# Patient Record
Sex: MALE | Race: WHITE | NOT HISPANIC OR LATINO | Employment: FULL TIME | URBAN - METROPOLITAN AREA
[De-identification: names, ages, dates, MRNs, and addresses within clinical notes are randomized per-mention and may not be internally consistent; named-entity substitution may affect disease eponyms.]

---

## 2023-04-06 PROBLEM — M48.061 SPINAL STENOSIS AT L4-L5 LEVEL: Status: ACTIVE | Noted: 2023-04-06

## 2023-04-06 PROBLEM — H33.313 RETINAL TEAR OF BOTH EYES: Status: ACTIVE | Noted: 2023-04-06

## 2023-04-06 PROBLEM — G89.29 PAIN, FOOT, CHRONIC: Status: ACTIVE | Noted: 2023-04-06

## 2023-04-06 PROBLEM — G47.00 INSOMNIA: Status: ACTIVE | Noted: 2023-04-06

## 2023-04-06 PROBLEM — E55.9 VITAMIN D DEFICIENCY: Status: ACTIVE | Noted: 2023-04-06

## 2023-04-06 PROBLEM — M54.50 LUMBAR PAIN WITH RADIATION DOWN RIGHT LEG: Status: ACTIVE | Noted: 2023-04-06

## 2023-04-06 PROBLEM — E78.5 HLD (HYPERLIPIDEMIA): Status: ACTIVE | Noted: 2023-04-06

## 2023-04-06 PROBLEM — M79.673 PAIN, FOOT, CHRONIC: Status: ACTIVE | Noted: 2023-04-06

## 2023-04-06 PROBLEM — I83.812 VARICOSE VEINS OF LEFT LOWER EXTREMITY WITH PAIN: Status: ACTIVE | Noted: 2023-04-06

## 2023-04-06 PROBLEM — M47.816 DEGENERATIVE JOINT DISEASE (DJD) OF LUMBAR SPINE: Status: ACTIVE | Noted: 2023-04-06

## 2023-04-06 PROBLEM — M51.27 HERNIATED NUCLEUS PULPOSUS OF LUMBOSACRAL REGION: Status: ACTIVE | Noted: 2023-04-06

## 2023-04-06 PROBLEM — G57.90 NEUROPATHY OF FOOT: Status: ACTIVE | Noted: 2023-04-06

## 2023-04-06 PROBLEM — F41.9 ANXIETY: Status: ACTIVE | Noted: 2023-04-06

## 2023-04-06 PROBLEM — F17.200 NICOTINE ADDICTION: Status: ACTIVE | Noted: 2023-04-06

## 2023-04-06 PROBLEM — M79.604 LUMBAR PAIN WITH RADIATION DOWN RIGHT LEG: Status: ACTIVE | Noted: 2023-04-06

## 2023-04-06 PROBLEM — R53.83 FATIGUE: Status: ACTIVE | Noted: 2023-04-06

## 2023-04-06 PROBLEM — F32.A DEPRESSION: Status: ACTIVE | Noted: 2023-04-06

## 2023-04-17 ENCOUNTER — APPOINTMENT (OUTPATIENT)
Dept: PRIMARY CARE | Facility: CLINIC | Age: 48
End: 2023-04-17

## 2023-06-12 RX ORDER — FLUOXETINE HYDROCHLORIDE 40 MG/1
CAPSULE ORAL
COMMUNITY
Start: 2022-01-25 | End: 2023-06-28 | Stop reason: SDUPTHER

## 2023-06-12 RX ORDER — ROSUVASTATIN CALCIUM 10 MG/1
1 TABLET, COATED ORAL DAILY
COMMUNITY
Start: 2022-11-09 | End: 2023-06-28 | Stop reason: SDUPTHER

## 2023-06-12 RX ORDER — TIZANIDINE HYDROCHLORIDE 4 MG/1
4 CAPSULE, GELATIN COATED ORAL 3 TIMES DAILY
COMMUNITY
End: 2023-06-28 | Stop reason: SDUPTHER

## 2023-06-12 RX ORDER — MULTIVITAMIN
TABLET ORAL
COMMUNITY
Start: 2022-10-24

## 2023-06-12 RX ORDER — DICLOFENAC POTASSIUM 50 MG/1
TABLET, FILM COATED ORAL 3 TIMES DAILY
COMMUNITY
End: 2023-06-28 | Stop reason: SDUPTHER

## 2023-06-12 RX ORDER — ERGOCALCIFEROL 1.25 MG/1
1 CAPSULE ORAL
COMMUNITY
Start: 2022-04-25 | End: 2023-07-31 | Stop reason: SDUPTHER

## 2023-06-12 RX ORDER — QUETIAPINE FUMARATE 25 MG/1
TABLET, FILM COATED ORAL
COMMUNITY
Start: 2022-01-25 | End: 2023-06-28 | Stop reason: SDUPTHER

## 2023-06-22 NOTE — TELEPHONE ENCOUNTER
REFILL REQUEST    Med: Fluoxetine   Med Dose: 40 mg  Med Frequency: one cap daily    Pharmacy: [facility]  Pharmacy Address: [address] [place] [address]    LR: 10/24/2022  LV: 01/24/2023  NV: None, could not find a place on your schedule for him (need to speak to you about this)

## 2023-06-25 RX ORDER — FLUOXETINE HYDROCHLORIDE 40 MG/1
40 CAPSULE ORAL
OUTPATIENT
Start: 2023-06-25

## 2023-06-28 ENCOUNTER — OFFICE VISIT (OUTPATIENT)
Dept: PRIMARY CARE | Facility: CLINIC | Age: 48
End: 2023-06-28
Payer: COMMERCIAL

## 2023-06-28 VITALS
HEIGHT: 74 IN | SYSTOLIC BLOOD PRESSURE: 118 MMHG | HEART RATE: 80 BPM | DIASTOLIC BLOOD PRESSURE: 68 MMHG | WEIGHT: 212 LBS | OXYGEN SATURATION: 98 % | BODY MASS INDEX: 27.21 KG/M2

## 2023-06-28 DIAGNOSIS — R53.83 OTHER FATIGUE: ICD-10-CM

## 2023-06-28 DIAGNOSIS — D72.829 LEUKOCYTOSIS, UNSPECIFIED TYPE: ICD-10-CM

## 2023-06-28 DIAGNOSIS — F41.9 ANXIETY: ICD-10-CM

## 2023-06-28 DIAGNOSIS — G47.00 INSOMNIA, UNSPECIFIED TYPE: ICD-10-CM

## 2023-06-28 DIAGNOSIS — E78.5 HYPERLIPIDEMIA, UNSPECIFIED HYPERLIPIDEMIA TYPE: ICD-10-CM

## 2023-06-28 DIAGNOSIS — F17.210 CIGARETTE NICOTINE DEPENDENCE WITHOUT COMPLICATION: Primary | ICD-10-CM

## 2023-06-28 DIAGNOSIS — R73.09 ELEVATED GLUCOSE: ICD-10-CM

## 2023-06-28 DIAGNOSIS — M79.604 LUMBAR PAIN WITH RADIATION DOWN RIGHT LEG: ICD-10-CM

## 2023-06-28 DIAGNOSIS — H83.3X3 NOISE-INDUCED HEARING LOSS OF BOTH EARS: ICD-10-CM

## 2023-06-28 DIAGNOSIS — F32.0 CURRENT MILD EPISODE OF MAJOR DEPRESSIVE DISORDER WITHOUT PRIOR EPISODE (CMS-HCC): ICD-10-CM

## 2023-06-28 DIAGNOSIS — M47.896 OTHER OSTEOARTHRITIS OF SPINE, LUMBAR REGION: ICD-10-CM

## 2023-06-28 DIAGNOSIS — M54.50 LUMBAR PAIN WITH RADIATION DOWN RIGHT LEG: ICD-10-CM

## 2023-06-28 DIAGNOSIS — E66.3 OVERWEIGHT WITH BODY MASS INDEX (BMI) OF 27 TO 27.9 IN ADULT: ICD-10-CM

## 2023-06-28 PROCEDURE — 99406 BEHAV CHNG SMOKING 3-10 MIN: CPT | Performed by: NURSE PRACTITIONER

## 2023-06-28 PROCEDURE — 99214 OFFICE O/P EST MOD 30 MIN: CPT | Performed by: NURSE PRACTITIONER

## 2023-06-28 PROCEDURE — 4004F PT TOBACCO SCREEN RCVD TLK: CPT | Performed by: NURSE PRACTITIONER

## 2023-06-28 PROCEDURE — 3008F BODY MASS INDEX DOCD: CPT | Performed by: NURSE PRACTITIONER

## 2023-06-28 RX ORDER — ROSUVASTATIN CALCIUM 10 MG/1
10 TABLET, COATED ORAL DAILY
Qty: 90 TABLET | Refills: 1 | Status: SHIPPED | OUTPATIENT
Start: 2023-06-28 | End: 2023-07-31 | Stop reason: ALTCHOICE

## 2023-06-28 RX ORDER — TIZANIDINE HYDROCHLORIDE 4 MG/1
4 CAPSULE, GELATIN COATED ORAL 2 TIMES DAILY PRN
Qty: 60 CAPSULE | Refills: 1 | Status: SHIPPED | OUTPATIENT
Start: 2023-06-28 | End: 2023-07-31 | Stop reason: SDUPTHER

## 2023-06-28 RX ORDER — FLUOXETINE HYDROCHLORIDE 40 MG/1
40 CAPSULE ORAL
Qty: 90 CAPSULE | Refills: 1 | Status: SHIPPED | OUTPATIENT
Start: 2023-06-28 | End: 2023-07-31 | Stop reason: SDUPTHER

## 2023-06-28 RX ORDER — DICLOFENAC POTASSIUM 50 MG/1
50 TABLET, FILM COATED ORAL 2 TIMES DAILY PRN
Qty: 60 TABLET | Refills: 5 | Status: SHIPPED | OUTPATIENT
Start: 2023-06-28 | End: 2023-07-31 | Stop reason: SDUPTHER

## 2023-06-28 RX ORDER — QUETIAPINE FUMARATE 25 MG/1
25 TABLET, FILM COATED ORAL NIGHTLY
Qty: 90 TABLET | Refills: 1 | Status: SHIPPED | OUTPATIENT
Start: 2023-06-28 | End: 2023-07-31 | Stop reason: SDUPTHER

## 2023-06-28 ASSESSMENT — PATIENT HEALTH QUESTIONNAIRE - PHQ9
1. LITTLE INTEREST OR PLEASURE IN DOING THINGS: NOT AT ALL
2. FEELING DOWN, DEPRESSED OR HOPELESS: NOT AT ALL
SUM OF ALL RESPONSES TO PHQ9 QUESTIONS 1 & 2: 0

## 2023-06-28 NOTE — PROGRESS NOTES
General Medical Management Note    47 y.o. male presents for Medical Management    HPI    Denies recent hospitalizations, surgeries or ER visits    Diet:   healthy, keto   Caffeine per day: 1 pot  Water per day:  at least 64 ounces  Exercise: 25,000 steps per day  Alcohol: Sober 1/3/22  Tobacco: 15 cigarettes per day    Anxiety and depression:   Managed well with Prozac 40 mg.   Denies thoughts of suicide or homicide     Insomnia:  Managed well with Seroquel 25 mg.   States that he sleeps well and does not wake up groggy     Vitamin D deficiency:   Taking prescribed Vit D  Taking MVI    Hyperlipidemia:   Managed with medication: Crestor   Denies myalgias or arthralgias.    LBP:  Diclofenac every day  Tizanidine as needed      Past Medical History:   Diagnosis Date    Anesthesia of skin 03/05/2020    Numbness and tingling    Personal history of other diseases of the nervous system and sense organs 04/30/2020    History of eye problem    Personal history of suicidal behavior 01/25/2022    History of suicide attempt      Past Surgical History:   Procedure Laterality Date    BACK SURGERY  06/08/2021    Back Surgery    IR ABLATION VEIN RFA  11/5/2019    IR ABLATION VEIN RFA 11/5/2019 [facility]    OTHER SURGICAL HISTORY  12/31/2019    Radiofrequency ablation    OTHER SURGICAL HISTORY  04/30/2020    Ablation    OTHER SURGICAL HISTORY  03/05/2020    Vasectomy     Family History   Problem Relation Name Age of Onset    Hypertension Mother      Hypertension Father      Other (malignant neoplasm of breast) Maternal Grandmother      Colon cancer Maternal Grandfather      Other (cardiac disorder) Paternal Grandmother      Other (cardiac disorder) Paternal Grandfather        Social History     Socioeconomic History    Marital status:      Spouse name: Not on file    Number of children: Not on file    Years of education: Not on file    Highest education level: Not on file   Occupational History    Not on file   Tobacco  "Use    Smoking status: Every Day     Packs/day: 1.00     Years: 15.00     Total pack years: 15.00     Types: Cigarettes    Smokeless tobacco: Never   Substance and Sexual Activity    Alcohol use: Not on file    Drug use: Never    Sexual activity: Not on file   Other Topics Concern    Not on file   Social History Narrative    Not on file     Social Determinants of Health     Financial Resource Strain: Not on file   Food Insecurity: Not on file   Transportation Needs: Not on file   Physical Activity: Not on file   Stress: Not on file   Social Connections: Not on file   Intimate Partner Violence: Not on file   Housing Stability: Not on file       Current Outpatient Medications on File Prior to Visit   Medication Sig Dispense Refill    diclofenac (Cataflam) 50 mg tablet Take by mouth 3 times a day.      ergocalciferol (Vitamin D-2) 1.25 MG (35664 UT) capsule Take 1 capsule (1,250 mcg) by mouth 1 (one) time per week.      FLUoxetine (PROzac) 40 mg capsule Take by mouth once daily.      multivitamin (Daily Multi-Vitamin) tablet Take by mouth.      QUEtiapine (SEROquel) 25 mg tablet Take by mouth.      rosuvastatin (Crestor) 10 mg tablet Take 1 tablet (10 mg) by mouth once daily.      tiZANidine (Zanaflex) 4 mg capsule Take 1 capsule (4 mg) by mouth 3 times a day.       No current facility-administered medications on file prior to visit.       Not on File      ROS: Denies chest pain, SOB, Headache, GI problems     Visit Vitals  Smoking Status Every Day    Visit Vitals  /68   Pulse 80   Ht 1.88 m (6' 2\")   Wt 96.2 kg (212 lb)   SpO2 98%   BMI 27.22 kg/m²   Smoking Status Every Day   BSA 2.24 m²         PHYSICAL EXAM:  Alert and oriented x3.  Eyes: EOM grossly intact  Neck supple without lymph adenopathy or carotid bruit.  No masses or thyromegaly  Heart regular rate and rhythm without murmur.  Lungs clear to auscultation.  Legs without edema.  Gait is non-antalgic  Speech clear.  Hearing adequate.  "         DIAGNOSIS/PLAN:    1. Hyperlipidemia, unspecified hyperlipidemia type  - Comprehensive metabolic panel; Future  - Lipid panel; Future  - rosuvastatin (Crestor) 10 mg tablet; Take 1 tablet (10 mg) by mouth once daily.  Dispense: 90 tablet; Refill: 1    2. Insomnia, unspecified type  Stable.  Continue current medications.  - QUEtiapine (SEROquel) 25 mg tablet; Take 1 tablet (25 mg) by mouth once daily at bedtime.  Dispense: 90 tablet; Refill: 1    3. Anxiety  Stable.   Continue current medication/treatment plan.     Aware at any time if thoughts of suicide or homicide are present contact medical services immediately.  - FLUoxetine (PROzac) 40 mg capsule; Take 1 capsule (40 mg) by mouth once daily.  Dispense: 90 capsule; Refill: 1    4. Current mild episode of major depressive disorder without prior episode (CMS/HCC)  Stable.   Continue current medication/treatment plan.     Aware at any time if thoughts of suicide or homicide are present contact medical services immediately.    6. Cigarette nicotine dependence without complication  I spent more than three minutes but less than 10 minutes counseling patient about need for smoking/tobacco cessation and how I can support efforts when patient is ready to quit. Discussed various therapies.    7. Noise-induced hearing loss of both ears  - Referral to Audiology; Future    8. Lumbar pain with radiation down right leg  Continue to remain active and perform stretching exercises  - diclofenac (Cataflam) 50 mg tablet; Take 1 tablet (50 mg) by mouth 2 times a day as needed (lower back pain).  Dispense: 60 tablet; Refill: 5  - tiZANidine (Zanaflex) 4 mg capsule; Take 1 capsule (4 mg) by mouth 2 times a day as needed for muscle spasms.  Dispense: 60 capsule; Refill: 1    9. Overweight with body mass index of 27 to 27.9 in adult:   Patient encouraged to follow diet of lower carbohydrates and increase vegetable and fruit intake.   Patient also encouraged to increase water  intake to 80 ounces/day.   Start or continue exercise for at least 30 minutes a day on most days of the week.     offers various ways to learn about healthy eating and healthy weight loss.     Medications refills will be completed as discussed.     Any labs or testing that is ordered will be reviewed and the results will be in your chart .   You can review these via  Octopart.     Follow up six months for complete physical exam.    Prescriptions will not be filled unless you are compliant with your follow-up appointments or have a follow-up appointment scheduled as per the instruction of your provider. Refills for medications should be requested at the time of your office visit.     Please allow one week for refill requests to be completed.     Contact office with any questions or concerns.   Preferred communication is via  Octopart  Please contact [email or web address] if having issues with  Octopart    Monique MURPHY-[facility]  [address], [address]  [place] [address]  Phone: [phone]    **Charting was completed using voice recognition technology and may include unintended errors**

## 2023-06-29 ENCOUNTER — LAB (OUTPATIENT)
Dept: LAB | Facility: LAB | Age: 48
End: 2023-06-29
Payer: COMMERCIAL

## 2023-06-29 DIAGNOSIS — R53.83 OTHER FATIGUE: ICD-10-CM

## 2023-06-29 DIAGNOSIS — Z00.00 HEALTHCARE MAINTENANCE: ICD-10-CM

## 2023-06-29 DIAGNOSIS — E78.5 HYPERLIPIDEMIA, UNSPECIFIED HYPERLIPIDEMIA TYPE: ICD-10-CM

## 2023-06-29 DIAGNOSIS — E55.9 VITAMIN D DEFICIENCY: ICD-10-CM

## 2023-06-29 LAB
ALANINE AMINOTRANSFERASE (SGPT) (U/L) IN SER/PLAS: 28 U/L (ref 10–52)
ALBUMIN (G/DL) IN SER/PLAS: 4.5 G/DL (ref 3.4–5)
ALKALINE PHOSPHATASE (U/L) IN SER/PLAS: 81 U/L (ref 33–120)
ANION GAP IN SER/PLAS: 13 MMOL/L (ref 10–20)
APPEARANCE, URINE: CLEAR
ASPARTATE AMINOTRANSFERASE (SGOT) (U/L) IN SER/PLAS: 21 U/L (ref 9–39)
BILIRUBIN TOTAL (MG/DL) IN SER/PLAS: 0.6 MG/DL (ref 0–1.2)
BILIRUBIN, URINE: NEGATIVE
BLOOD, URINE: NEGATIVE
CALCIDIOL (25 OH VITAMIN D3) (NG/ML) IN SER/PLAS: 44 NG/ML
CALCIUM (MG/DL) IN SER/PLAS: 9.5 MG/DL (ref 8.6–10.3)
CARBON DIOXIDE, TOTAL (MMOL/L) IN SER/PLAS: 25 MMOL/L (ref 21–32)
CHLORIDE (MMOL/L) IN SER/PLAS: 105 MMOL/L (ref 98–107)
CHOLESTEROL (MG/DL) IN SER/PLAS: 245 MG/DL (ref 0–199)
CHOLESTEROL IN HDL (MG/DL) IN SER/PLAS: 44.7 MG/DL
CHOLESTEROL/HDL RATIO: 5.5
COLOR, URINE: YELLOW
CREATININE (MG/DL) IN SER/PLAS: 0.81 MG/DL (ref 0.5–1.3)
ERYTHROCYTE DISTRIBUTION WIDTH (RATIO) BY AUTOMATED COUNT: 13.4 % (ref 11.5–14.5)
ERYTHROCYTE MEAN CORPUSCULAR HEMOGLOBIN CONCENTRATION (G/DL) BY AUTOMATED: 32.4 G/DL (ref 32–36)
ERYTHROCYTE MEAN CORPUSCULAR VOLUME (FL) BY AUTOMATED COUNT: 93 FL (ref 80–100)
ERYTHROCYTES (10*6/UL) IN BLOOD BY AUTOMATED COUNT: 5.16 X10E12/L (ref 4.5–5.9)
GFR MALE: >90 ML/MIN/1.73M2
GLUCOSE (MG/DL) IN SER/PLAS: 142 MG/DL (ref 74–99)
GLUCOSE, URINE: NEGATIVE MG/DL
HEMATOCRIT (%) IN BLOOD BY AUTOMATED COUNT: 48.2 % (ref 41–52)
HEMOGLOBIN (G/DL) IN BLOOD: 15.6 G/DL (ref 13.5–17.5)
KETONES, URINE: NEGATIVE MG/DL
LDL: 171 MG/DL (ref 0–99)
LEUKOCYTE ESTERASE, URINE: NEGATIVE
LEUKOCYTES (10*3/UL) IN BLOOD BY AUTOMATED COUNT: 14.1 X10E9/L (ref 4.4–11.3)
NITRITE, URINE: NEGATIVE
PH, URINE: 6 (ref 5–8)
PLATELETS (10*3/UL) IN BLOOD AUTOMATED COUNT: 289 X10E9/L (ref 150–450)
POTASSIUM (MMOL/L) IN SER/PLAS: 4.7 MMOL/L (ref 3.5–5.3)
PROSTATE SPECIFIC ANTIGEN,SCREEN: 2.64 NG/ML (ref 0–4)
PROTEIN TOTAL: 7.6 G/DL (ref 6.4–8.2)
PROTEIN, URINE: NEGATIVE MG/DL
SODIUM (MMOL/L) IN SER/PLAS: 138 MMOL/L (ref 136–145)
SPECIFIC GRAVITY, URINE: 1.02 (ref 1–1.03)
TESTOSTERONE (NG/DL) IN SER/PLAS: 554 NG/DL (ref 240–1000)
THYROTROPIN (MIU/L) IN SER/PLAS BY DETECTION LIMIT <= 0.05 MIU/L: 2.1 MIU/L (ref 0.44–3.98)
TRIGLYCERIDE (MG/DL) IN SER/PLAS: 146 MG/DL (ref 0–149)
UREA NITROGEN (MG/DL) IN SER/PLAS: 20 MG/DL (ref 6–23)
UROBILINOGEN, URINE: <2 MG/DL (ref 0–1.9)
VLDL: 29 MG/DL (ref 0–40)

## 2023-06-29 PROCEDURE — 36415 COLL VENOUS BLD VENIPUNCTURE: CPT

## 2023-06-29 PROCEDURE — 85027 COMPLETE CBC AUTOMATED: CPT

## 2023-06-29 PROCEDURE — 82306 VITAMIN D 25 HYDROXY: CPT

## 2023-06-29 PROCEDURE — 80061 LIPID PANEL: CPT

## 2023-06-29 PROCEDURE — 84153 ASSAY OF PSA TOTAL: CPT

## 2023-06-29 PROCEDURE — 81003 URINALYSIS AUTO W/O SCOPE: CPT

## 2023-06-29 PROCEDURE — 84443 ASSAY THYROID STIM HORMONE: CPT

## 2023-06-29 PROCEDURE — 84403 ASSAY OF TOTAL TESTOSTERONE: CPT

## 2023-06-29 PROCEDURE — 80053 COMPREHEN METABOLIC PANEL: CPT

## 2023-07-06 DIAGNOSIS — E78.5 HYPERLIPIDEMIA, UNSPECIFIED HYPERLIPIDEMIA TYPE: Primary | ICD-10-CM

## 2023-07-06 PROBLEM — E66.3 OVERWEIGHT WITH BODY MASS INDEX (BMI) OF 27 TO 27.9 IN ADULT: Status: ACTIVE | Noted: 2023-07-06

## 2023-07-06 RX ORDER — ROSUVASTATIN CALCIUM 20 MG/1
20 TABLET, COATED ORAL DAILY
Qty: 90 TABLET | Refills: 1 | Status: SHIPPED | OUTPATIENT
Start: 2023-07-06 | End: 2023-07-31 | Stop reason: SDUPTHER

## 2023-07-31 ENCOUNTER — TELEPHONE (OUTPATIENT)
Dept: PRIMARY CARE | Facility: CLINIC | Age: 48
End: 2023-07-31

## 2023-07-31 DIAGNOSIS — M79.604 LUMBAR PAIN WITH RADIATION DOWN RIGHT LEG: ICD-10-CM

## 2023-07-31 DIAGNOSIS — E78.5 HYPERLIPIDEMIA, UNSPECIFIED HYPERLIPIDEMIA TYPE: ICD-10-CM

## 2023-07-31 DIAGNOSIS — E55.9 VITAMIN D DEFICIENCY: Primary | ICD-10-CM

## 2023-07-31 DIAGNOSIS — G47.00 INSOMNIA, UNSPECIFIED TYPE: ICD-10-CM

## 2023-07-31 DIAGNOSIS — F41.9 ANXIETY: ICD-10-CM

## 2023-07-31 DIAGNOSIS — M54.50 LUMBAR PAIN WITH RADIATION DOWN RIGHT LEG: ICD-10-CM

## 2023-07-31 RX ORDER — TIZANIDINE HYDROCHLORIDE 4 MG/1
4 CAPSULE, GELATIN COATED ORAL 2 TIMES DAILY PRN
Qty: 60 CAPSULE | Refills: 1 | Status: SHIPPED | OUTPATIENT
Start: 2023-07-31 | End: 2024-01-08 | Stop reason: SDUPTHER

## 2023-07-31 RX ORDER — ERGOCALCIFEROL 1.25 MG/1
1 CAPSULE ORAL
Qty: 13 CAPSULE | Refills: 3 | Status: SHIPPED | OUTPATIENT
Start: 2023-07-31 | End: 2024-01-08 | Stop reason: SDUPTHER

## 2023-07-31 RX ORDER — DICLOFENAC POTASSIUM 50 MG/1
50 TABLET, FILM COATED ORAL 2 TIMES DAILY PRN
Qty: 60 TABLET | Refills: 5 | Status: SHIPPED | OUTPATIENT
Start: 2023-07-31 | End: 2024-01-08 | Stop reason: SDUPTHER

## 2023-07-31 RX ORDER — QUETIAPINE FUMARATE 25 MG/1
25 TABLET, FILM COATED ORAL NIGHTLY
Qty: 90 TABLET | Refills: 1 | Status: SHIPPED | OUTPATIENT
Start: 2023-07-31 | End: 2024-01-08 | Stop reason: SDUPTHER

## 2023-07-31 RX ORDER — ROSUVASTATIN CALCIUM 20 MG/1
20 TABLET, COATED ORAL DAILY
Qty: 90 TABLET | Refills: 1 | Status: SHIPPED | OUTPATIENT
Start: 2023-07-31 | End: 2024-01-08 | Stop reason: SDUPTHER

## 2023-07-31 RX ORDER — FLUOXETINE HYDROCHLORIDE 40 MG/1
40 CAPSULE ORAL
Qty: 90 CAPSULE | Refills: 1 | Status: SHIPPED | OUTPATIENT
Start: 2023-07-31 | End: 2024-01-08 | Stop reason: SDUPTHER

## 2023-07-31 NOTE — TELEPHONE ENCOUNTER
Pt's insurance will not cover his medication at [facility] anymore. He needs all his medications sent to [facility] in [place].     Medications:     Rosuvastatin 20 MG; Take 1 tablet once daily.     LR: 7-6-23  90 tablets with 1 refill     Diclofenac 50 MG; Take 1 tablet by mouth 2 times a day as needed (lower back pain).    LR: 6-28-23  60 tablets with 5 refills     Fluoxetine 40 MG; Take 1 capsule once daily.     LR: 6-28-23  90 capsules with 1 refill    Quetiapine 25 MG; Take 1 tablet once daily at bedtime.     LR: 6-28-23  90 tablets with 1 refill    Tizanidine 4 MG; Take 1 capsule 2 times a day as needed for muscle spasms.     LR: 6-28-23  60 capsules with 1 refills     Pharmacy: [facility]   Phone Number: [phone]

## 2023-08-31 ENCOUNTER — LAB (OUTPATIENT)
Dept: LAB | Facility: LAB | Age: 48
End: 2023-08-31
Payer: COMMERCIAL

## 2023-08-31 DIAGNOSIS — R73.09 ELEVATED GLUCOSE: ICD-10-CM

## 2023-08-31 DIAGNOSIS — D72.829 LEUKOCYTOSIS, UNSPECIFIED TYPE: ICD-10-CM

## 2023-08-31 DIAGNOSIS — E78.5 HYPERLIPIDEMIA, UNSPECIFIED HYPERLIPIDEMIA TYPE: ICD-10-CM

## 2023-08-31 LAB
CHOLESTEROL (MG/DL) IN SER/PLAS: 150 MG/DL (ref 0–199)
CHOLESTEROL IN HDL (MG/DL) IN SER/PLAS: 43.6 MG/DL
CHOLESTEROL/HDL RATIO: 3.4
ERYTHROCYTE DISTRIBUTION WIDTH (RATIO) BY AUTOMATED COUNT: 13.5 % (ref 11.5–14.5)
ERYTHROCYTE MEAN CORPUSCULAR HEMOGLOBIN CONCENTRATION (G/DL) BY AUTOMATED: 32.4 G/DL (ref 32–36)
ERYTHROCYTE MEAN CORPUSCULAR VOLUME (FL) BY AUTOMATED COUNT: 94 FL (ref 80–100)
ERYTHROCYTES (10*6/UL) IN BLOOD BY AUTOMATED COUNT: 4.8 X10E12/L (ref 4.5–5.9)
ESTIMATED AVERAGE GLUCOSE FOR HBA1C: 117 MG/DL
HEMATOCRIT (%) IN BLOOD BY AUTOMATED COUNT: 45 % (ref 41–52)
HEMOGLOBIN (G/DL) IN BLOOD: 14.6 G/DL (ref 13.5–17.5)
HEMOGLOBIN A1C/HEMOGLOBIN TOTAL IN BLOOD: 5.7 %
LDL: 84 MG/DL (ref 0–99)
LEUKOCYTES (10*3/UL) IN BLOOD BY AUTOMATED COUNT: 12.6 X10E9/L (ref 4.4–11.3)
PLATELETS (10*3/UL) IN BLOOD AUTOMATED COUNT: 247 X10E9/L (ref 150–450)
TRIGLYCERIDE (MG/DL) IN SER/PLAS: 111 MG/DL (ref 0–149)
VLDL: 22 MG/DL (ref 0–40)

## 2023-08-31 PROCEDURE — 83036 HEMOGLOBIN GLYCOSYLATED A1C: CPT

## 2023-08-31 PROCEDURE — 85027 COMPLETE CBC AUTOMATED: CPT

## 2023-08-31 PROCEDURE — 36415 COLL VENOUS BLD VENIPUNCTURE: CPT

## 2023-08-31 PROCEDURE — 80061 LIPID PANEL: CPT

## 2023-12-21 PROBLEM — F33.41 RECURRENT MAJOR DEPRESSION IN PARTIAL REMISSION (CMS-HCC): Status: ACTIVE | Noted: 2020-03-24

## 2023-12-21 PROBLEM — N52.9 ERECTILE DYSFUNCTION: Status: ACTIVE | Noted: 2023-12-21

## 2023-12-21 PROBLEM — R93.7 ABNORMAL MAGNETIC RESONANCE IMAGING OF LUMBAR SPINE: Status: ACTIVE | Noted: 2023-12-21

## 2023-12-21 PROBLEM — T22.00XA: Status: ACTIVE | Noted: 2023-12-21

## 2023-12-21 PROBLEM — R20.2 NUMBNESS AND TINGLING SENSATION OF SKIN: Status: ACTIVE | Noted: 2023-12-21

## 2023-12-21 PROBLEM — Z91.51 HISTORY OF SUICIDE ATTEMPT: Status: ACTIVE | Noted: 2023-12-21

## 2023-12-21 PROBLEM — L84 CALLUS OF FOOT: Status: ACTIVE | Noted: 2023-12-21

## 2023-12-21 PROBLEM — S62.309A FRACTURE OF METACARPAL BONE: Status: ACTIVE | Noted: 2023-12-21

## 2023-12-21 PROBLEM — R73.09 INCREASED GLUCOSE LEVEL: Status: ACTIVE | Noted: 2023-08-31

## 2023-12-21 PROBLEM — H83.3X3 NOISE-INDUCED HEARING LOSS OF BOTH EARS: Status: ACTIVE | Noted: 2023-12-21

## 2023-12-21 PROBLEM — Z86.69 HISTORY OF DISORDER OF EYE REGION: Status: ACTIVE | Noted: 2023-12-21

## 2023-12-21 PROBLEM — I83.90 VARICOSE VEINS OF LOWER EXTREMITY: Status: ACTIVE | Noted: 2023-04-06

## 2023-12-21 PROBLEM — R39.198 SLOWING OF URINARY STREAM: Status: ACTIVE | Noted: 2023-12-21

## 2023-12-21 PROBLEM — M25.519 ARTHRALGIA OF SHOULDER: Status: ACTIVE | Noted: 2023-12-21

## 2023-12-21 PROBLEM — R20.0 NUMBNESS AND TINGLING SENSATION OF SKIN: Status: ACTIVE | Noted: 2023-12-21

## 2023-12-21 PROBLEM — D72.829 LEUKOCYTOSIS: Status: ACTIVE | Noted: 2023-08-31

## 2023-12-21 PROBLEM — R03.0 FINDING OF ABOVE NORMAL BLOOD PRESSURE: Status: ACTIVE | Noted: 2023-12-21

## 2023-12-21 PROBLEM — F43.9 STRESS: Status: ACTIVE | Noted: 2023-12-21

## 2023-12-21 PROBLEM — R25.2 SPASM: Status: ACTIVE | Noted: 2023-12-21

## 2023-12-21 PROBLEM — M21.70 INEQUALITY OF LENGTH OF LOWER EXTREMITY: Status: ACTIVE | Noted: 2023-12-21

## 2023-12-21 PROBLEM — S90.30XA CONTUSION OF FOOT: Status: ACTIVE | Noted: 2023-12-21

## 2024-01-04 ENCOUNTER — APPOINTMENT (OUTPATIENT)
Dept: PRIMARY CARE | Facility: CLINIC | Age: 49
End: 2024-01-04
Payer: COMMERCIAL

## 2024-01-08 ENCOUNTER — OFFICE VISIT (OUTPATIENT)
Dept: PRIMARY CARE | Facility: CLINIC | Age: 49
End: 2024-01-08
Payer: COMMERCIAL

## 2024-01-08 ENCOUNTER — LAB (OUTPATIENT)
Dept: LAB | Facility: LAB | Age: 49
End: 2024-01-08
Payer: COMMERCIAL

## 2024-01-08 VITALS
OXYGEN SATURATION: 99 % | DIASTOLIC BLOOD PRESSURE: 70 MMHG | HEART RATE: 81 BPM | HEIGHT: 74 IN | WEIGHT: 223 LBS | SYSTOLIC BLOOD PRESSURE: 128 MMHG | BODY MASS INDEX: 28.62 KG/M2

## 2024-01-08 DIAGNOSIS — F51.01 PRIMARY INSOMNIA: ICD-10-CM

## 2024-01-08 DIAGNOSIS — Z00.00 ENCOUNTER FOR HEALTH MAINTENANCE EXAMINATION IN ADULT: ICD-10-CM

## 2024-01-08 DIAGNOSIS — M79.604 LUMBAR PAIN WITH RADIATION DOWN RIGHT LEG: ICD-10-CM

## 2024-01-08 DIAGNOSIS — M54.50 LUMBAR PAIN WITH RADIATION DOWN RIGHT LEG: ICD-10-CM

## 2024-01-08 DIAGNOSIS — E55.9 VITAMIN D DEFICIENCY: ICD-10-CM

## 2024-01-08 DIAGNOSIS — E78.2 MIXED HYPERLIPIDEMIA: ICD-10-CM

## 2024-01-08 DIAGNOSIS — R53.83 OTHER FATIGUE: ICD-10-CM

## 2024-01-08 DIAGNOSIS — Z00.00 ENCOUNTER FOR HEALTH MAINTENANCE EXAMINATION IN ADULT: Primary | ICD-10-CM

## 2024-01-08 DIAGNOSIS — R73.09 ELEVATED GLUCOSE: ICD-10-CM

## 2024-01-08 DIAGNOSIS — F41.9 ANXIETY: ICD-10-CM

## 2024-01-08 DIAGNOSIS — G47.00 INSOMNIA, UNSPECIFIED TYPE: ICD-10-CM

## 2024-01-08 DIAGNOSIS — F33.41 RECURRENT MAJOR DEPRESSION IN PARTIAL REMISSION (CMS-HCC): ICD-10-CM

## 2024-01-08 DIAGNOSIS — E78.5 HYPERLIPIDEMIA, UNSPECIFIED HYPERLIPIDEMIA TYPE: ICD-10-CM

## 2024-01-08 PROBLEM — T22.00XA: Status: RESOLVED | Noted: 2023-12-21 | Resolved: 2024-01-08

## 2024-01-08 PROBLEM — S90.30XA CONTUSION OF FOOT: Status: RESOLVED | Noted: 2023-12-21 | Resolved: 2024-01-08

## 2024-01-08 LAB
25(OH)D3 SERPL-MCNC: 53 NG/ML (ref 30–100)
ALBUMIN SERPL BCP-MCNC: 4.5 G/DL (ref 3.4–5)
ALP SERPL-CCNC: 78 U/L (ref 33–120)
ALT SERPL W P-5'-P-CCNC: 23 U/L (ref 10–52)
ANION GAP SERPL CALC-SCNC: 12 MMOL/L (ref 10–20)
APPEARANCE UR: CLEAR
AST SERPL W P-5'-P-CCNC: 17 U/L (ref 9–39)
BILIRUB SERPL-MCNC: 0.8 MG/DL (ref 0–1.2)
BILIRUB UR STRIP.AUTO-MCNC: NEGATIVE MG/DL
BUN SERPL-MCNC: 12 MG/DL (ref 6–23)
CALCIUM SERPL-MCNC: 9.3 MG/DL (ref 8.6–10.3)
CHLORIDE SERPL-SCNC: 104 MMOL/L (ref 98–107)
CHOLEST SERPL-MCNC: 157 MG/DL (ref 0–199)
CHOLESTEROL/HDL RATIO: 3.1
CO2 SERPL-SCNC: 27 MMOL/L (ref 21–32)
COLOR UR: YELLOW
CREAT SERPL-MCNC: 0.82 MG/DL (ref 0.5–1.3)
EGFRCR SERPLBLD CKD-EPI 2021: >90 ML/MIN/1.73M*2
ERYTHROCYTE [DISTWIDTH] IN BLOOD BY AUTOMATED COUNT: 12.5 % (ref 11.5–14.5)
EST. AVERAGE GLUCOSE BLD GHB EST-MCNC: 123 MG/DL
GLUCOSE SERPL-MCNC: 118 MG/DL (ref 74–99)
GLUCOSE UR STRIP.AUTO-MCNC: NEGATIVE MG/DL
HBA1C MFR BLD: 5.9 %
HCT VFR BLD AUTO: 45.8 % (ref 41–52)
HDLC SERPL-MCNC: 50.9 MG/DL
HGB BLD-MCNC: 15.2 G/DL (ref 13.5–17.5)
KETONES UR STRIP.AUTO-MCNC: NEGATIVE MG/DL
LDLC SERPL CALC-MCNC: 95 MG/DL
LEUKOCYTE ESTERASE UR QL STRIP.AUTO: NEGATIVE
MCH RBC QN AUTO: 31.1 PG (ref 26–34)
MCHC RBC AUTO-ENTMCNC: 33.2 G/DL (ref 32–36)
MCV RBC AUTO: 94 FL (ref 80–100)
NITRITE UR QL STRIP.AUTO: NEGATIVE
NON HDL CHOLESTEROL: 106 MG/DL (ref 0–149)
NRBC BLD-RTO: 0 /100 WBCS (ref 0–0)
PH UR STRIP.AUTO: 6 [PH]
PLATELET # BLD AUTO: 338 X10*3/UL (ref 150–450)
POTASSIUM SERPL-SCNC: 5.1 MMOL/L (ref 3.5–5.3)
PROT SERPL-MCNC: 7 G/DL (ref 6.4–8.2)
PROT UR STRIP.AUTO-MCNC: NEGATIVE MG/DL
PSA SERPL-MCNC: 1.76 NG/ML
RBC # BLD AUTO: 4.88 X10*6/UL (ref 4.5–5.9)
RBC # UR STRIP.AUTO: NEGATIVE /UL
SODIUM SERPL-SCNC: 138 MMOL/L (ref 136–145)
SP GR UR STRIP.AUTO: 1.01
TESTOST SERPL-MCNC: 415 NG/DL (ref 240–1000)
TRIGL SERPL-MCNC: 55 MG/DL (ref 0–149)
TSH SERPL-ACNC: 1.04 MIU/L (ref 0.44–3.98)
UROBILINOGEN UR STRIP.AUTO-MCNC: <2 MG/DL
VLDL: 11 MG/DL (ref 0–40)
WBC # BLD AUTO: 10.5 X10*3/UL (ref 4.4–11.3)

## 2024-01-08 PROCEDURE — 82306 VITAMIN D 25 HYDROXY: CPT

## 2024-01-08 PROCEDURE — 99214 OFFICE O/P EST MOD 30 MIN: CPT | Performed by: NURSE PRACTITIONER

## 2024-01-08 PROCEDURE — 99396 PREV VISIT EST AGE 40-64: CPT | Performed by: NURSE PRACTITIONER

## 2024-01-08 PROCEDURE — 80061 LIPID PANEL: CPT

## 2024-01-08 PROCEDURE — 81003 URINALYSIS AUTO W/O SCOPE: CPT

## 2024-01-08 PROCEDURE — 85027 COMPLETE CBC AUTOMATED: CPT

## 2024-01-08 PROCEDURE — 3008F BODY MASS INDEX DOCD: CPT | Performed by: NURSE PRACTITIONER

## 2024-01-08 PROCEDURE — 84153 ASSAY OF PSA TOTAL: CPT

## 2024-01-08 PROCEDURE — 84443 ASSAY THYROID STIM HORMONE: CPT

## 2024-01-08 PROCEDURE — 80053 COMPREHEN METABOLIC PANEL: CPT

## 2024-01-08 PROCEDURE — 84403 ASSAY OF TOTAL TESTOSTERONE: CPT

## 2024-01-08 PROCEDURE — 36415 COLL VENOUS BLD VENIPUNCTURE: CPT

## 2024-01-08 PROCEDURE — 83036 HEMOGLOBIN GLYCOSYLATED A1C: CPT

## 2024-01-08 RX ORDER — QUETIAPINE FUMARATE 25 MG/1
25 TABLET, FILM COATED ORAL NIGHTLY
Qty: 90 TABLET | Refills: 1 | Status: SHIPPED | OUTPATIENT
Start: 2024-01-08

## 2024-01-08 RX ORDER — FLUOXETINE HYDROCHLORIDE 40 MG/1
40 CAPSULE ORAL
Qty: 90 CAPSULE | Refills: 1 | Status: SHIPPED | OUTPATIENT
Start: 2024-01-08

## 2024-01-08 RX ORDER — TIZANIDINE HYDROCHLORIDE 4 MG/1
4 CAPSULE, GELATIN COATED ORAL 2 TIMES DAILY PRN
Qty: 30 CAPSULE | Refills: 1 | Status: SHIPPED | OUTPATIENT
Start: 2024-01-08

## 2024-01-08 RX ORDER — ROSUVASTATIN CALCIUM 20 MG/1
20 TABLET, COATED ORAL DAILY
Qty: 90 TABLET | Refills: 1 | Status: SHIPPED | OUTPATIENT
Start: 2024-01-08 | End: 2024-07-06

## 2024-01-08 RX ORDER — DICLOFENAC POTASSIUM 50 MG/1
50 TABLET, FILM COATED ORAL 2 TIMES DAILY PRN
Qty: 60 TABLET | Refills: 5 | Status: SHIPPED | OUTPATIENT
Start: 2024-01-08

## 2024-01-08 RX ORDER — ERGOCALCIFEROL 1.25 MG/1
1 CAPSULE ORAL
Qty: 13 CAPSULE | Refills: 3 | Status: SHIPPED | OUTPATIENT
Start: 2024-01-08

## 2024-01-08 ASSESSMENT — PATIENT HEALTH QUESTIONNAIRE - PHQ9
2. FEELING DOWN, DEPRESSED OR HOPELESS: NOT AT ALL
SUM OF ALL RESPONSES TO PHQ9 QUESTIONS 1 & 2: 0
1. LITTLE INTEREST OR PLEASURE IN DOING THINGS: NOT AT ALL

## 2024-01-08 NOTE — PROGRESS NOTES
Annual Comprehensive Medical Exam:    48 y.o. male presents for annual comprehensive medical evaluation and preventive services screening.      Recent hospitalizations, surgeries or ER visits: denies     Allowed to report any questions or concerns.    Diet: healthy  Caffeine per day: 1/2 pot of coffee, 1 diet coke  Water per day: 64 ounces  Exercise: 25,000 steps per day  Alcohol: Sober 1/3/22  Nicotine: 15 cigarettes per day   Dentist: overdue  Optometrist: 2020 glasses  Colonoscopy: never    Tobacco: 15 cigarettes per day     Anxiety and depression:   Med: Prozac 40 mg.   Denies thoughts of suicide or homicide  Seeing a counselor every 2 weeks      Insomnia:  Med: Seroquel 25 mg.   States that he sleeps well and does not wake up groggy     Vitamin D deficiency:   Taking prescribed Vit D  Taking MVI     Hyperlipidemia:   Med: Crestor   Denies myalgias or arthralgias.     LBP:  Was taking Diclofenac every day--has been out   Tizanidine as needed. States he can go a month or 2 without needing it         Past Medical History:   Diagnosis Date    Anesthesia of skin 03/05/2020    Numbness and tingling    Personal history of other diseases of the nervous system and sense organs 04/30/2020    History of eye problem    Personal history of suicidal behavior 01/25/2022    History of suicide attempt      Past Surgical History:   Procedure Laterality Date    BACK SURGERY  06/08/2021    Back Surgery    IR ABLATION VEIN RFA  11/5/2019    IR ABLATION VEIN RFA 11/5/2019 [facility]    OTHER SURGICAL HISTORY  12/31/2019    Radiofrequency ablation    OTHER SURGICAL HISTORY  04/30/2020    Ablation    OTHER SURGICAL HISTORY  03/05/2020    Vasectomy       Family History   Problem Relation Name Age of Onset    Hypertension Mother      Hypertension Father      Diabetes type II Father      Other (malignant neoplasm of breast) Maternal Grandmother      Colon cancer Maternal Grandfather      Other (cardiac disorder) Paternal Grandmother       Other (cardiac disorder) Paternal Grandfather          Social History     Socioeconomic History    Marital status:      Spouse name: Not on file    Number of children: Not on file    Years of education: Not on file    Highest education level: Not on file   Occupational History    Not on file   Tobacco Use    Smoking status: Every Day     Packs/day: 1.00     Years: 15.00     Additional pack years: 0.00     Total pack years: 15.00     Types: Cigarettes    Smokeless tobacco: Never   Substance and Sexual Activity    Alcohol use: Not Currently    Drug use: Never    Sexual activity: Not on file   Other Topics Concern    Not on file   Social History Narrative    Not on file     Social Determinants of Health     Financial Resource Strain: Not on file   Food Insecurity: Not on file   Transportation Needs: Not on file   Physical Activity: Not on file   Stress: Not on file   Social Connections: Not on file   Intimate Partner Violence: Not on file   Housing Stability: Not on file       Current Outpatient Medications on File Prior to Visit   Medication Sig Dispense Refill    diclofenac (Cataflam) 50 mg tablet Take 1 tablet (50 mg) by mouth 2 times a day as needed (lower back pain). 60 tablet 5    ergocalciferol (Vitamin D-2) 1.25 MG (66912 UT) capsule Take 1 capsule (1,250 mcg) by mouth 1 (one) time per week. 13 capsule 3    FLUoxetine (PROzac) 40 mg capsule Take 1 capsule (40 mg) by mouth once daily. 90 capsule 1    multivitamin (Daily Multi-Vitamin) tablet Take by mouth.      QUEtiapine (SEROquel) 25 mg tablet Take 1 tablet (25 mg) by mouth once daily at bedtime. 90 tablet 1    rosuvastatin (Crestor) 20 mg tablet Take 1 tablet (20 mg) by mouth once daily. 90 tablet 1    tiZANidine (Zanaflex) 4 mg capsule Take 1 capsule (4 mg) by mouth 2 times a day as needed for muscle spasms. 60 capsule 1     No current facility-administered medications on file prior to visit.       Allergies   Allergen Reactions    Yellow Jacket  "Venom Anaphylaxis       Visit Vitals  /70   Pulse 81   Ht 1.88 m (6' 2\")   Wt 101 kg (223 lb)   SpO2 99%   BMI 28.63 kg/m²   Smoking Status Every Day   BSA 2.3 m²         Physical Exam    Gen.: Alert and oriented x3 male in no acute distress.  HEENT: Head is normocephalic.  Pupils equal and reactive to light.   Neck is supple without adenopathy or carotid bruits.  Heart: Regular rate and rhythm without murmurs.  Lungs: Clear to auscultation bilaterally.  Abdomen: Soft with normal bowel sounds.  No masses or pain to palpation.  No bruits auscultated.  Extremities: Good range of motion of all joints.  No significant edema. Pedal pulses +1-2/4  Neuro: No signs of focal neurologic deficit.  No tremor.  Speech and hearing are normal.  DTRs +3/4;  Muscle Strength +5/5.  Musculoskeletal: Spine with good ROM.  Leg lengths are equal.  Skin: No significant or irregular nevi visualized.  Psych: normal affect.  No suicidal ideation.  Good judgment and insight.    Diagnosis/Plan:     1. Encounter for health maintenance examination in adult    - Comprehensive metabolic panel; Future  - CBC; Future  - TSH with reflex to Free T4 if abnormal; Future  - Testosterone; Future  - Prostate Spec.Ag,Screen; Future  - Urinalysis with Reflex Microscopic; Future    2. Primary insomnia  - QUEtiapine (SEROquel) 25 mg tablet; Take 1 tablet (25 mg) by mouth once daily at bedtime.  Dispense: 90 tablet; Refill: 1  - Comprehensive metabolic panel; Future  - CBC; Future  - TSH with reflex to Free T4 if abnormal; Future  - Testosterone; Future  - Prostate Spec.Ag,Screen; Future  - Urinalysis with Reflex Microscopic; Future    3. Anxiety    Stable.   Continue current medication/treatment plan.   Continue seeing counselor   Aware at any time if thoughts of suicide or homicide are present contact medical services immediately.    - FLUoxetine (PROzac) 40 mg capsule; Take 1 capsule (40 mg) by mouth once daily.  Dispense: 90 capsule; Refill: 1    4. " Recurrent major depression in partial remission (CMS/HCC)    Stable.   Continue current medication/treatment plan.   Continue seeing counselor   Aware at any time if thoughts of suicide or homicide are present contact medical services immediately.    - FLUoxetine (PROzac) 40 mg capsule; Take 1 capsule (40 mg) by mouth once daily.  Dispense: 90 capsule; Refill: 1    5. Vitamin D deficiency    - Vitamin D 25-Hydroxy,Total (for eval of Vitamin D levels); Future  - ergocalciferol (Vitamin D-2) 1.25 MG (44524 UT) capsule; Take 1 capsule (1,250 mcg) by mouth 1 (one) time per week.  Dispense: 13 capsule; Refill: 3    6. Mixed hyperlipidemia  - rosuvastatin (Crestor) 20 mg tablet; Take 1 tablet (20 mg) by mouth once daily.  Dispense: 90 tablet; Refill: 1  - Comprehensive metabolic panel; Future  - CBC; Future  - TSH with reflex to Free T4 if abnormal; Future  - Lipid panel; Future    7. Lumbar pain with radiation down right leg  Perform stretching exercises   - diclofenac (Cataflam) 50 mg tablet; Take 1 tablet (50 mg) by mouth 2 times a day as needed (lower back pain).  Dispense: 60 tablet; Refill: 5  - tiZANidine (Zanaflex) 4 mg capsule; Take 1 capsule (4 mg) by mouth 2 times a day as needed for muscle spasms.  Dispense: 30 capsule; Refill: 1    8. Elevated glucose    - Hemoglobin A1c; Future      Any labs or testing that is ordered will be reviewed and the results will be in your chart .   You can review these via  Learncafe.     Follow up six months for medication management.     Prescriptions will not be filled unless you are compliant with your follow-up appointments or have a follow-up appointment scheduled as per the instruction of your provider. Refills for medications should be requested at the time of your office visit.     Please allow one week for refill requests to be completed.     Contact office with any questions or concerns.   Preferred communication is via  Learncafe  Please contact  [email or web address] if having issues with  Eloisahart    Monique Mckeon [facility]  [address], [address]  [place] [address]  Phone: [phone]    **Charting was completed using voice recognition technology and may include unintended errors**

## 2024-01-21 ENCOUNTER — APPOINTMENT (OUTPATIENT)
Dept: RADIOLOGY | Facility: HOSPITAL | Age: 49
End: 2024-01-21
Payer: COMMERCIAL

## 2024-01-21 ENCOUNTER — HOSPITAL ENCOUNTER (EMERGENCY)
Dept: CARDIOLOGY | Facility: HOSPITAL | Age: 49
Discharge: HOME | End: 2024-01-21
Payer: COMMERCIAL

## 2024-01-21 ENCOUNTER — HOSPITAL ENCOUNTER (EMERGENCY)
Facility: HOSPITAL | Age: 49
Discharge: HOME | End: 2024-01-21
Attending: STUDENT IN AN ORGANIZED HEALTH CARE EDUCATION/TRAINING PROGRAM
Payer: COMMERCIAL

## 2024-01-21 VITALS
TEMPERATURE: 97 F | WEIGHT: 210.54 LBS | HEIGHT: 74 IN | SYSTOLIC BLOOD PRESSURE: 149 MMHG | HEART RATE: 84 BPM | RESPIRATION RATE: 18 BRPM | BODY MASS INDEX: 27.02 KG/M2 | OXYGEN SATURATION: 100 % | DIASTOLIC BLOOD PRESSURE: 85 MMHG

## 2024-01-21 DIAGNOSIS — R07.89 CHEST WALL PAIN: Primary | ICD-10-CM

## 2024-01-21 LAB
ALBUMIN SERPL BCP-MCNC: 4.5 G/DL (ref 3.4–5)
ALP SERPL-CCNC: 72 U/L (ref 33–120)
ALT SERPL W P-5'-P-CCNC: 28 U/L (ref 10–52)
ANION GAP SERPL CALC-SCNC: 12 MMOL/L (ref 10–20)
AST SERPL W P-5'-P-CCNC: 23 U/L (ref 9–39)
ATRIAL RATE: 101 BPM
BASOPHILS # BLD AUTO: 0.05 X10*3/UL (ref 0–0.1)
BASOPHILS NFR BLD AUTO: 0.4 %
BILIRUB SERPL-MCNC: 0.9 MG/DL (ref 0–1.2)
BUN SERPL-MCNC: 13 MG/DL (ref 6–23)
CALCIUM SERPL-MCNC: 9.2 MG/DL (ref 8.6–10.3)
CARDIAC TROPONIN I PNL SERPL HS: 3 NG/L (ref 0–20)
CARDIAC TROPONIN I PNL SERPL HS: 3 NG/L (ref 0–20)
CHLORIDE SERPL-SCNC: 106 MMOL/L (ref 98–107)
CO2 SERPL-SCNC: 22 MMOL/L (ref 21–32)
CREAT SERPL-MCNC: 0.87 MG/DL (ref 0.5–1.3)
EGFRCR SERPLBLD CKD-EPI 2021: >90 ML/MIN/1.73M*2
EOSINOPHIL # BLD AUTO: 0.08 X10*3/UL (ref 0–0.7)
EOSINOPHIL NFR BLD AUTO: 0.7 %
ERYTHROCYTE [DISTWIDTH] IN BLOOD BY AUTOMATED COUNT: 12.6 % (ref 11.5–14.5)
FLUAV RNA RESP QL NAA+PROBE: NOT DETECTED
FLUBV RNA RESP QL NAA+PROBE: NOT DETECTED
GLUCOSE SERPL-MCNC: 100 MG/DL (ref 74–99)
HCT VFR BLD AUTO: 44.3 % (ref 41–52)
HGB BLD-MCNC: 15 G/DL (ref 13.5–17.5)
IMM GRANULOCYTES # BLD AUTO: 0.04 X10*3/UL (ref 0–0.7)
IMM GRANULOCYTES NFR BLD AUTO: 0.4 % (ref 0–0.9)
LIPASE SERPL-CCNC: 51 U/L (ref 9–82)
LYMPHOCYTES # BLD AUTO: 1.65 X10*3/UL (ref 1.2–4.8)
LYMPHOCYTES NFR BLD AUTO: 14.8 %
MCH RBC QN AUTO: 30.6 PG (ref 26–34)
MCHC RBC AUTO-ENTMCNC: 33.9 G/DL (ref 32–36)
MCV RBC AUTO: 90 FL (ref 80–100)
MONOCYTES # BLD AUTO: 0.52 X10*3/UL (ref 0.1–1)
MONOCYTES NFR BLD AUTO: 4.7 %
NEUTROPHILS # BLD AUTO: 8.79 X10*3/UL (ref 1.2–7.7)
NEUTROPHILS NFR BLD AUTO: 79 %
NRBC BLD-RTO: 0 /100 WBCS (ref 0–0)
P AXIS: 73 DEGREES
P OFFSET: 194 MS
P ONSET: 139 MS
PLATELET # BLD AUTO: 236 X10*3/UL (ref 150–450)
POTASSIUM SERPL-SCNC: 3.6 MMOL/L (ref 3.5–5.3)
PR INTERVAL: 156 MS
PROT SERPL-MCNC: 7.5 G/DL (ref 6.4–8.2)
Q ONSET: 217 MS
QRS COUNT: 17 BEATS
QRS DURATION: 98 MS
QT INTERVAL: 356 MS
QTC CALCULATION(BAZETT): 461 MS
QTC FREDERICIA: 423 MS
R AXIS: 74 DEGREES
RBC # BLD AUTO: 4.9 X10*6/UL (ref 4.5–5.9)
SARS-COV-2 RNA RESP QL NAA+PROBE: NOT DETECTED
SODIUM SERPL-SCNC: 136 MMOL/L (ref 136–145)
T AXIS: 30 DEGREES
T OFFSET: 395 MS
VENTRICULAR RATE: 101 BPM
WBC # BLD AUTO: 11.1 X10*3/UL (ref 4.4–11.3)

## 2024-01-21 PROCEDURE — 87636 SARSCOV2 & INF A&B AMP PRB: CPT | Performed by: STUDENT IN AN ORGANIZED HEALTH CARE EDUCATION/TRAINING PROGRAM

## 2024-01-21 PROCEDURE — 80053 COMPREHEN METABOLIC PANEL: CPT | Performed by: STUDENT IN AN ORGANIZED HEALTH CARE EDUCATION/TRAINING PROGRAM

## 2024-01-21 PROCEDURE — 83690 ASSAY OF LIPASE: CPT | Performed by: STUDENT IN AN ORGANIZED HEALTH CARE EDUCATION/TRAINING PROGRAM

## 2024-01-21 PROCEDURE — 85025 COMPLETE CBC W/AUTO DIFF WBC: CPT | Performed by: STUDENT IN AN ORGANIZED HEALTH CARE EDUCATION/TRAINING PROGRAM

## 2024-01-21 PROCEDURE — 71045 X-RAY EXAM CHEST 1 VIEW: CPT

## 2024-01-21 PROCEDURE — 71275 CT ANGIOGRAPHY CHEST: CPT | Performed by: RADIOLOGY

## 2024-01-21 PROCEDURE — 2550000001 HC RX 255 CONTRASTS: Performed by: STUDENT IN AN ORGANIZED HEALTH CARE EDUCATION/TRAINING PROGRAM

## 2024-01-21 PROCEDURE — 84484 ASSAY OF TROPONIN QUANT: CPT | Performed by: STUDENT IN AN ORGANIZED HEALTH CARE EDUCATION/TRAINING PROGRAM

## 2024-01-21 PROCEDURE — 93005 ELECTROCARDIOGRAM TRACING: CPT

## 2024-01-21 PROCEDURE — 71275 CT ANGIOGRAPHY CHEST: CPT

## 2024-01-21 PROCEDURE — 99284 EMERGENCY DEPT VISIT MOD MDM: CPT | Mod: 25 | Performed by: STUDENT IN AN ORGANIZED HEALTH CARE EDUCATION/TRAINING PROGRAM

## 2024-01-21 PROCEDURE — 71045 X-RAY EXAM CHEST 1 VIEW: CPT | Performed by: RADIOLOGY

## 2024-01-21 PROCEDURE — 36415 COLL VENOUS BLD VENIPUNCTURE: CPT | Performed by: STUDENT IN AN ORGANIZED HEALTH CARE EDUCATION/TRAINING PROGRAM

## 2024-01-21 RX ADMIN — IOHEXOL 75 ML: 350 INJECTION, SOLUTION INTRAVENOUS at 13:00

## 2024-01-21 ASSESSMENT — COLUMBIA-SUICIDE SEVERITY RATING SCALE - C-SSRS
6. HAVE YOU EVER DONE ANYTHING, STARTED TO DO ANYTHING, OR PREPARED TO DO ANYTHING TO END YOUR LIFE?: NO
2. HAVE YOU ACTUALLY HAD ANY THOUGHTS OF KILLING YOURSELF?: NO
1. IN THE PAST MONTH, HAVE YOU WISHED YOU WERE DEAD OR WISHED YOU COULD GO TO SLEEP AND NOT WAKE UP?: NO

## 2024-01-21 ASSESSMENT — LIFESTYLE VARIABLES
HAVE YOU EVER FELT YOU SHOULD CUT DOWN ON YOUR DRINKING: NO
EVER HAD A DRINK FIRST THING IN THE MORNING TO STEADY YOUR NERVES TO GET RID OF A HANGOVER: NO
HAVE PEOPLE ANNOYED YOU BY CRITICIZING YOUR DRINKING: NO
REASON UNABLE TO ASSESS: NO
EVER FELT BAD OR GUILTY ABOUT YOUR DRINKING: NO

## 2024-01-21 ASSESSMENT — PAIN SCALES - GENERAL
PAINLEVEL_OUTOF10: 5 - MODERATE PAIN
PAINLEVEL_OUTOF10: 8
PAINLEVEL_OUTOF10: 8

## 2024-01-21 ASSESSMENT — PAIN - FUNCTIONAL ASSESSMENT: PAIN_FUNCTIONAL_ASSESSMENT: 0-10

## 2024-01-21 NOTE — Clinical Note
Andrea Palacios was seen and treated in our emergency department on 1/21/2024.  He may return to work on 01/22/2024.  Activity as tolerated.     If you have any questions or concerns, please don't hesitate to call.      Dhruv Obrien MD
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
## 2024-07-08 ENCOUNTER — APPOINTMENT (OUTPATIENT)
Dept: PRIMARY CARE | Facility: CLINIC | Age: 49
End: 2024-07-08
Payer: COMMERCIAL

## 2024-09-03 ENCOUNTER — APPOINTMENT (OUTPATIENT)
Dept: PRIMARY CARE | Facility: CLINIC | Age: 49
End: 2024-09-03
Payer: COMMERCIAL

## 2024-09-03 VITALS
WEIGHT: 214 LBS | DIASTOLIC BLOOD PRESSURE: 80 MMHG | BODY MASS INDEX: 27.46 KG/M2 | OXYGEN SATURATION: 98 % | SYSTOLIC BLOOD PRESSURE: 128 MMHG | HEIGHT: 74 IN | HEART RATE: 82 BPM

## 2024-09-03 DIAGNOSIS — E55.9 VITAMIN D DEFICIENCY: ICD-10-CM

## 2024-09-03 DIAGNOSIS — M79.604 LUMBAR PAIN WITH RADIATION DOWN RIGHT LEG: ICD-10-CM

## 2024-09-03 DIAGNOSIS — M48.061 SPINAL STENOSIS AT L4-L5 LEVEL: Primary | ICD-10-CM

## 2024-09-03 DIAGNOSIS — E78.5 HYPERLIPIDEMIA, UNSPECIFIED HYPERLIPIDEMIA TYPE: ICD-10-CM

## 2024-09-03 DIAGNOSIS — M54.50 LUMBAR PAIN WITH RADIATION DOWN RIGHT LEG: ICD-10-CM

## 2024-09-03 PROCEDURE — 3008F BODY MASS INDEX DOCD: CPT | Performed by: NURSE PRACTITIONER

## 2024-09-03 PROCEDURE — 4004F PT TOBACCO SCREEN RCVD TLK: CPT | Performed by: NURSE PRACTITIONER

## 2024-09-03 PROCEDURE — 99214 OFFICE O/P EST MOD 30 MIN: CPT | Performed by: NURSE PRACTITIONER

## 2024-09-03 RX ORDER — ERGOCALCIFEROL 1.25 MG/1
1 CAPSULE ORAL
Qty: 13 CAPSULE | Refills: 3 | Status: SHIPPED | OUTPATIENT
Start: 2024-09-03

## 2024-09-03 RX ORDER — TIZANIDINE HYDROCHLORIDE 4 MG/1
4 CAPSULE, GELATIN COATED ORAL 2 TIMES DAILY PRN
Qty: 30 CAPSULE | Refills: 2 | Status: SHIPPED | OUTPATIENT
Start: 2024-09-03

## 2024-09-03 RX ORDER — ROSUVASTATIN CALCIUM 20 MG/1
20 TABLET, COATED ORAL DAILY
Qty: 90 TABLET | Refills: 1 | Status: SHIPPED | OUTPATIENT
Start: 2024-09-03 | End: 2025-03-02

## 2024-09-03 RX ORDER — DICLOFENAC POTASSIUM 50 MG/1
50 TABLET, FILM COATED ORAL 2 TIMES DAILY PRN
Qty: 60 TABLET | Refills: 5 | Status: SHIPPED | OUTPATIENT
Start: 2024-09-03

## 2024-09-03 ASSESSMENT — PATIENT HEALTH QUESTIONNAIRE - PHQ9
1. LITTLE INTEREST OR PLEASURE IN DOING THINGS: NOT AT ALL
SUM OF ALL RESPONSES TO PHQ9 QUESTIONS 1 & 2: 0
2. FEELING DOWN, DEPRESSED OR HOPELESS: NOT AT ALL
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
## 2024-09-23 ENCOUNTER — APPOINTMENT (OUTPATIENT)
Dept: PRIMARY CARE | Facility: CLINIC | Age: 49
End: 2024-09-23
Payer: COMMERCIAL

## 2024-10-23 ENCOUNTER — HOSPITAL ENCOUNTER (OUTPATIENT)
Dept: RADIOLOGY | Facility: CLINIC | Age: 49
Discharge: HOME | End: 2024-10-23
Payer: COMMERCIAL

## 2024-10-23 ENCOUNTER — CONSULT (OUTPATIENT)
Dept: ORTHOPEDIC SURGERY | Facility: CLINIC | Age: 49
End: 2024-10-23
Payer: COMMERCIAL

## 2024-10-23 DIAGNOSIS — M47.816 LUMBAR SPONDYLOSIS: ICD-10-CM

## 2024-10-23 DIAGNOSIS — M48.061 SPINAL STENOSIS AT L4-L5 LEVEL: ICD-10-CM

## 2024-10-23 DIAGNOSIS — M54.16 LEFT LUMBAR RADICULITIS: ICD-10-CM

## 2024-10-23 DIAGNOSIS — M47.816 LUMBAR SPONDYLOSIS: Primary | ICD-10-CM

## 2024-10-23 PROCEDURE — 72120 X-RAY BEND ONLY L-S SPINE: CPT

## 2024-10-23 PROCEDURE — 99214 OFFICE O/P EST MOD 30 MIN: CPT | Performed by: PHYSICAL MEDICINE & REHABILITATION

## 2024-10-23 PROCEDURE — 99244 OFF/OP CNSLTJ NEW/EST MOD 40: CPT | Performed by: PHYSICAL MEDICINE & REHABILITATION

## 2024-10-23 PROCEDURE — 72114 X-RAY EXAM L-S SPINE BENDING: CPT | Performed by: RADIOLOGY

## 2024-10-23 RX ORDER — GABAPENTIN 300 MG/1
300 CAPSULE ORAL 3 TIMES DAILY
Qty: 90 CAPSULE | Refills: 1 | Status: SHIPPED | OUTPATIENT
Start: 2024-10-23 | End: 2024-11-22

## 2024-10-23 SDOH — SOCIAL STABILITY: SOCIAL NETWORK: SOCIAL ACTIVITY:: 8
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
## 2024-11-06 ENCOUNTER — EVALUATION (OUTPATIENT)
Dept: PHYSICAL THERAPY | Facility: CLINIC | Age: 49
End: 2024-11-06
Payer: COMMERCIAL

## 2024-11-06 DIAGNOSIS — M47.816 LUMBAR SPONDYLOSIS: ICD-10-CM

## 2024-11-06 DIAGNOSIS — M54.16 LEFT LUMBAR RADICULITIS: ICD-10-CM

## 2024-11-06 PROCEDURE — 97161 PT EVAL LOW COMPLEX 20 MIN: CPT | Mod: GP | Performed by: PHYSICAL THERAPIST

## 2024-11-06 PROCEDURE — 97535 SELF CARE MNGMENT TRAINING: CPT | Mod: GP | Performed by: PHYSICAL THERAPIST

## 2024-11-06 PROCEDURE — 97110 THERAPEUTIC EXERCISES: CPT | Mod: GP | Performed by: PHYSICAL THERAPIST

## 2024-11-06 ASSESSMENT — PAIN DESCRIPTION - DESCRIPTORS: DESCRIPTORS: BURNING;DULL;SHARP

## 2024-11-06 ASSESSMENT — PAIN SCALES - GENERAL: PAINLEVEL_OUTOF10: 3

## 2024-11-06 ASSESSMENT — PAIN - FUNCTIONAL ASSESSMENT: PAIN_FUNCTIONAL_ASSESSMENT: 0-10

## 2024-11-06 NOTE — PROGRESS NOTES
"PT Initial Evaluation    Patient Name:  Andrea Palacios \"Jeremy"    MRN:  [id number]    :  1975    Today's Date:  24    Time Calculation  Start Time: 1130  Stop Time: 1215  Time Calculation (min): 45 min  PT Evaluation Time Entry  PT Evaluation (Low) Time Entry: 20  PT Therapeutic Procedures Time Entry  Therapeutic Exercise Time Entry: 8  Self-Care/Home Mgmt Training: 15       Informed Consent  Patient has been informed of all evaluation findings and treatment plans and agrees to participate in Physical Therapy services and plans as outlined.    Diagnosis:  Diagnosis and Precautions: M47.816 (ICD-10-CM) - Lumbar spondylosis  M54.16 (ICD-10-CM) - Left lumbar radiculitis    Goals:   By the end of 8 visits patient will be able to do the following with < 0-1/10 LUMBAR SPINE/L LE pain:    HEP:  Patient will consistently perform HIS home exercise program for 20-30 minute sessions, 1-2x/day, 3-4 days/week independently by the end of 8 visits.    Basic ADL's:   Patient will perform bADL's/instrumental ADL's for 30 minutes moving between various closed kinetic chain postures.    ROM and Strength:  Patient will demonstrate 5/5 R HIP and L HIP strength in all planes and WNL's LUMBAR SPINE AROM in all planes to improve their ability to lift, stand, ambulate and perform basic ADL's.    Stair Negotiation:  Patient will be able to ambulate up/down stairs for 1-2 flights at a time.    Gait/Locomotion:  Patient will be able to ambulate for 30-60 minutes at a time.  Minimal to no gait deviation across level ground/stairs.    WORK:  Patient will return to WORK and perform normal WORK activities pain free.    Sleep:  Patient will sleep thru the night 4/7 nights/week.    Participation restrictions:  Decrease Oswestry to < or = to 15% for increased functional ability.    Pain:  Decrease pain at worst to < or = to 0-1/10 for improved QOL and ability to sleep.    No point tenderness noted over the lower back.     Plan of Care: "      Treatment/Interventions: Cryotherapy, Education/ Instruction, Electrical stimulation, Dry needling, Gait training, Manual therapy, Neuromuscular re-education, Self care/ home management, Taping techniques, Therapeutic activities, Therapeutic exercises  PT Plan: Skilled PT  PT Frequency:  (1-2x/week)  Duration: 8 visits     Certification Period Start Date: 11/06/24  Certification Period End Date: 02/04/25  Number of Treatments Authorized: 8  Rehab Potential: Good  Plan of Care Agreement: Patient    PT Assessment:    Patient is a 48 y.o. MALE with c/o lower back pain, L LE pain.   Patient is alert and oriented x 3.  Patient presents with medical diagnosis of M47.816 (ICD-10-CM) - Lumbar spondylosis M54.16 (ICD-10-CM) - Left lumbar radiculitis  contributing to compensatory soft tissue dysfunction, pain, stiffness and weakness of the lower back.   Significant past medical history/past surgical history includes see below including 3 previous lumbar surgeries.    Skilled care is needed to progress the patient back to these activities without exacerbating symptoms.   Patient requires skilled PT services to address the problems identified and the individualized patient's goals as outlined in the problems and goals section of this evaluation.  A skilled PT is required to address these key impairments and to provide and progress with an appropriate home exercise program. Patient does have any significant PMH influencing Rx and reports motivation to return to FUNCTIONAL ACTIVITY.   Patient demonstrates to be a good candidate for physical therapy with good rehab potential and verbalized a good understanding of HIS diagnosis, prognosis and treatment.  Goals have been established and reviewed with the patient.      PT Assessment Results: Decreased strength, Decreased range of motion, Decreased endurance, Impaired balance, Decreased mobility, Pain  Rehab Prognosis: Good  Evaluation/Treatment Tolerance: Patient limited by  fatigue, Patient limited by pain    Complexity:  Low complexity evaluation  due to a 20 minute duration, a past medical history WITH any personal factors and/or comorbidities that could impact the POC, examination of body systems completed on one to two elements, the patient presents with a stable condition, and clinical decision making using the GIO was of low complexity.     Prognosis:  Rehab Prognosis: Good    Problem List  Activity Limitations, Decreased Functional Level, Decreased knowledge of HEP, Flexibility, Gait issues, Pain, Range of Motion/joint mobility issues, Strength, and Endurance    Impairments   IMPAIRED ROM LOWER BODY, IMPAIRED STRENGTH LOWER BODY, IMPAIRED GAIT, IMPAIRED CORE STABILITY, INCREASED PAIN, IMPAIRED FUNCTIONAL ACTIVITY LEVEL, and IMPAIRED FUNCTIONAL MOVEMENT PATTERNS    Functional Limitations:  LIMITATIONS PERFORMING BASIC ADL'S, ISSUES WITH SLEEP, WORK ISSUES, PARTICIPATION IN HOBBIES, PARTICIPATION IN LEISURE ACTIVITIES, and PARTICIPATION IN HOME MANAGEMENT    General Visit Information:  Reason for Referral: PT Evaluation  Referred By: Dr. Rufina Chau  General Comment: M47.816 (ICD-10-CM) - Lumbar spondylosis  M54.16 (ICD-10-CM) - Left lumbar radiculitis    Pre-Cautions:  AVERYADI Fall Risk Score (The score of 4 or more indicates an increased risk of falling): 0     Medical Precautions:  (history of fracturing bilateral feet, R hand Boxer's fracture, depression, anxiety, 3 previous lower back surgeries 1994 discectomy, 2001 discectomy, 2020 laminectomy))     Reason for Visit:  PT Evaluate and Treat    Initial Evaluation:  Referred By: Dr. Rufina Chau    Insurance  Insurance reviewed  Name of Insurance:  [facility]  Visit No.  1  (EVAL) Lumbar spondylosis M47.816; Left lumbar radiculitis M54.16; MEDICAL [facility]: $20 COPAY // 0% COINSUR // 500/1000 DED (167.35/667.35 REMAINING) // 6600/13,200 OOP MAX // 20V CY HARD MAX LIMIT // PRIOR AUTH NOT REQ PER [employer] REF#:  [id number] // Lisa confirmed 10/30/24 12:37pm     Subjective:    Current Episode  Date of Onset:  6/2024  Mechanism of injury:  reached down a felt a tweak in his lower back.    Chief Complaint:  lower back pain, L lower extremity pain that radiates to the L glute, L hamstring, L knee, L lower leg/ankle  Progression of symptoms:  staying the same over months  Relevant medical history:  History of 3 previous lower back surgeries 1994 discectomy, 2001 discectomy, 2020 laminectomy     Pain Score:  Pain Assessment: 0-10  Pain Assessment  Pain Assessment: 0-10  0-10 (Numeric) Pain Score: 3 (8/10 worst, 3/10 best)  Pain Type: Chronic pain  Pain Location: Back  Pain Orientation: Left, Lower, Posterior  Pain Radiating Towards: left glute, left hamstring, L lower leg/ankle  Pain Descriptors: Burning, Dull, Sharp  Pain Frequency: Constant/continuous  Pain Onset: Ongoing  Pain Type: Chronic pain  Pain Location: Back  Pain Orientation: Left, Lower, Posterior  Pain Descriptors: Burning, Dull, Sharp  Pain Frequency: Constant/continuous    Better with:  icy hot, meds    Worse with:  bending, lifting, sneezing/coughing, driving, standing, walking, getting in/out of car, on/off toilet, in/out of bed    Medical History/Surgical History:  Medical Precautions:  (history of fracturing bilateral feet, R hand Boxer's fracture, depression, anxiety, 3 previous lower back surgeries 1994 discectomy, 2001 discectomy, 2020 laminectomy)    Reviewed medical history form with patient (medications/allergies reviewed with patient).  Current Outpatient Medications   Medication Instructions    diclofenac (CATAFLAM) 50 mg, oral, 2 times daily PRN, With food    ergocalciferol (VITAMIN D-2) 1,250 mcg, oral, Once Weekly    gabapentin (NEURONTIN) 300 mg, oral, 3 times daily    multivitamin (Daily Multi-Vitamin) tablet oral    rosuvastatin (CRESTOR) 20 mg, oral, Daily    tiZANidine (ZANAFLEX) 4 mg, oral, 2 times daily PRN     Radiology:  Exam  Information    Status Exam Begun Exam Ended   Final 10/23/2024 10:50 10/23/2024 10:58     Study Result    Narrative & Impression   Interpreted By:  Matt Max,   STUDY:  XR LUMBAR SPINE 4+ VIEWS WITH FLEXION EXTENSION      INDICATION:  Signs/Symptoms:Back pain.      COMPARISON:  June 4, 2020      ACCESSION NUMBER(S):  [id number]      ORDERING CLINICIAN:  LASHON ROJAS      FINDINGS:  Mild-to-moderate lumbar degenerative changes, slightly progressed  from prior exam.      No evidence fracture. Alignment normal. No pathologic motion.      IMPRESSION:  Mild-to-moderate diffuse lumbar degenerative change, slightly  progressed from prior.     For lower back/neck only:  Previous PT:  NO  Previous chiropractic:  NO  Previous acupuncture:  NO  Previous pain management:  NO  Lower back surgery:  YES    Functional Assessment:  Level of Crook:  Level of Crook: Independent with ADLs and functional transfers    Social History:    Occupation:    Work Status:  EMPLOYED  Patient Awareness:  Patient is aware of HIS diagnosis and prognosis.  Social Support/History:  LIVES WITH FAMILY    Objective:  Weightbearing Status:  FWB    Skin:  surgical incision lower back healed and closed, no active signs of infection.    Palpation:   point tenderness over L lumbar paraspinals    Sensation:  Patient denies numbness/tingling of bilateral LOWER extremities.    Gait:  Minor antalgic gait L LE    ROM:  AROM  Lumbar flexion able to reach just distal to bilateral patella with increased pain  Extension end range pain  Rotation 25% reduced R and L  R knee AROM WNL's, L knee AROM WNL's, R hip AROM WNL's, L hip AROM WNL's, R ankle/foot AROM WNL's, and L ankle/foot AROM WNL's    Strength:    L hip flexion 4-/5 with pain  L quadriceps 4/5 with pain  L hamstrings 4+/5  L ankle 5/5 all planes  L hallux extension 5/5  R hip flexion 4+/5  R knee 5/5 all planes  R ankle 5/5 all planes  R hallux extension 5/5    Special  Tests:  + SLR R and L, no appreciable leg length difference R to L    Outcome measure     Oswestry Disablity Index (GIO): 36%     Treatment  Time in clinic started at  11:30am  Time in clinic ended at  12:15pm  Total time in clinic is . 45 minutes  Total timed code time is  43 minutes    Treatment Performed Today:.   PT Initial Evaluation, Therapeutic Exercise, and Self-Care/Home Management HEP  Individual(s) Educated: Patient  Education Provided: Home Exercise Program  Diagnosis and Precautions: M47.816 (ICD-10-CM) - Lumbar spondylosis  M54.16 (ICD-10-CM) - Left lumbar radiculitis  Risk and Benefits Discussed with Patient/Caregiver/Other: yes  Patient/Caregiver Demonstrated Understanding: yes  Plan of Care Discussed and Agreed Upon: yes  Patient Response to Education: Patient/Caregiver Verbalized Understanding of Information, Patient/Caregiver Performed Return Demonstration of Exercises/Activities    Patient instructed in a home exercise program, has been given handouts for each of the exercises performed and was given another sheet instructing patient in the amount of reps to perform and the isaiah to follow while doing the exercises    Access Code: [email or web address]  URL: [email or web address]  Date: 11/06/2024  Prepared by: Matt Stauffer    Exercises  - Supine Posterior Pelvic Tilt  - 1 x daily - 3-4 x weekly - 2 sets - 10 reps - 5-10 hold  - Supine Bridge  - 1 x daily - 3-4 x weekly - 2 sets - 10 reps - 5-10 hold  - Supine March  - 1 x daily - 3-4 x weekly - 2 sets - 10 reps - 5-10 hold  - Hooklying Heel Walk  - 1 x daily - 3-4 x weekly - 2 sets - 10 reps - 5-10 hold
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
## 2024-11-19 ENCOUNTER — TREATMENT (OUTPATIENT)
Dept: PHYSICAL THERAPY | Facility: CLINIC | Age: 49
End: 2024-11-19
Payer: COMMERCIAL

## 2024-11-19 ENCOUNTER — OFFICE VISIT (OUTPATIENT)
Dept: ORTHOPEDIC SURGERY | Facility: CLINIC | Age: 49
End: 2024-11-19
Payer: COMMERCIAL

## 2024-11-19 VITALS — BODY MASS INDEX: 28.23 KG/M2 | WEIGHT: 220 LBS | HEIGHT: 74 IN

## 2024-11-19 DIAGNOSIS — R29.898 WEAKNESS OF LOWER EXTREMITY, UNSPECIFIED LATERALITY: ICD-10-CM

## 2024-11-19 DIAGNOSIS — M47.816 LUMBAR SPONDYLOSIS: ICD-10-CM

## 2024-11-19 DIAGNOSIS — M54.9 INTRACTABLE BACK PAIN: ICD-10-CM

## 2024-11-19 DIAGNOSIS — M54.16 LUMBAR RADICULOPATHY: ICD-10-CM

## 2024-11-19 DIAGNOSIS — M51.27 HERNIATED NUCLEUS PULPOSUS OF LUMBOSACRAL REGION: Primary | ICD-10-CM

## 2024-11-19 DIAGNOSIS — M54.16 LEFT LUMBAR RADICULITIS: ICD-10-CM

## 2024-11-19 PROCEDURE — 4004F PT TOBACCO SCREEN RCVD TLK: CPT | Performed by: FAMILY MEDICINE

## 2024-11-19 PROCEDURE — 99214 OFFICE O/P EST MOD 30 MIN: CPT | Performed by: FAMILY MEDICINE

## 2024-11-19 PROCEDURE — 3008F BODY MASS INDEX DOCD: CPT | Performed by: FAMILY MEDICINE

## 2024-11-19 ASSESSMENT — PATIENT HEALTH QUESTIONNAIRE - PHQ9
SUM OF ALL RESPONSES TO PHQ9 QUESTIONS 1 AND 2: 0
1. LITTLE INTEREST OR PLEASURE IN DOING THINGS: NOT AT ALL
2. FEELING DOWN, DEPRESSED OR HOPELESS: NOT AT ALL

## 2024-11-19 NOTE — LETTER
November 19, 2024     Patient: Andrea Palacios   YOB: 1975   Date of Visit: 11/19/2024       To Whom It May Concern:    It is my medical opinion that Andrea Palacios should remain out of work until further notice .    If you have any questions or concerns, please don't hesitate to call.         Sincerely,        Cole C Budinsky, MD    CC: No Recipients
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
## 2024-11-20 ENCOUNTER — HOSPITAL ENCOUNTER (OUTPATIENT)
Dept: RADIOLOGY | Facility: HOSPITAL | Age: 49
Discharge: HOME | End: 2024-11-20
Payer: COMMERCIAL

## 2024-11-20 DIAGNOSIS — M54.16 LUMBAR RADICULOPATHY: ICD-10-CM

## 2024-11-20 DIAGNOSIS — M54.9 INTRACTABLE BACK PAIN: ICD-10-CM

## 2024-11-20 DIAGNOSIS — R29.898 WEAKNESS OF LOWER EXTREMITY, UNSPECIFIED LATERALITY: ICD-10-CM

## 2024-11-20 PROCEDURE — 72148 MRI LUMBAR SPINE W/O DYE: CPT | Performed by: RADIOLOGY

## 2024-11-20 PROCEDURE — 72148 MRI LUMBAR SPINE W/O DYE: CPT

## 2024-11-26 ENCOUNTER — OFFICE VISIT (OUTPATIENT)
Dept: ORTHOPEDIC SURGERY | Facility: CLINIC | Age: 49
End: 2024-11-26
Payer: COMMERCIAL

## 2024-11-26 ENCOUNTER — APPOINTMENT (OUTPATIENT)
Dept: PHYSICAL THERAPY | Facility: CLINIC | Age: 49
End: 2024-11-26
Payer: COMMERCIAL

## 2024-11-26 VITALS — HEIGHT: 74 IN | WEIGHT: 210 LBS | BODY MASS INDEX: 26.95 KG/M2

## 2024-11-26 DIAGNOSIS — M47.816 LUMBAR SPONDYLOSIS: ICD-10-CM

## 2024-11-26 DIAGNOSIS — M54.16 LUMBAR RADICULOPATHY: Primary | ICD-10-CM

## 2024-11-26 DIAGNOSIS — M54.16 LEFT LUMBAR RADICULITIS: ICD-10-CM

## 2024-11-26 PROCEDURE — 99215 OFFICE O/P EST HI 40 MIN: CPT | Performed by: ORTHOPAEDIC SURGERY

## 2024-11-26 PROCEDURE — 3008F BODY MASS INDEX DOCD: CPT | Performed by: ORTHOPAEDIC SURGERY

## 2024-11-26 NOTE — PROGRESS NOTES
"Andrea Palacios \"Jeremy" is a 49 y.o. male who presents for Pain of the Lower Back (Seen by Dr Budinsky, Had MRI and xrays).    HPI:  49-year-old gentleman here for new patient evaluation of low back pain.  Previously seen by Dr. Budinsky.  He denies any fever chills nausea vomiting night sweats.  He has no bowel or bladder complaints.    Physical exam:  Well-nourished, well kept.No lymphangitis or lymphadenopathy in the examined extremities.  Gait normal ambulates with a cane, antalgic on the left.  Can stand on heels and toes with some difficulty if he balances on his cane.   Examination of the back shows tenderness in the paraspinous musculature.  There is decreased range of motion in all directions due to guarding/muscle spasms and pain at extremes.  There is good strength and no instability.  Examination of the lower extremities reveals no point tenderness, swelling, or deformity.  Range of motion of the hips, knees, and ankles are full without crepitance, instability, or exacerbation of pain.  Strength is 5/5 throughout except left knee flexion and extension is about 4+/5, this could be a pain guarding response, also except left ankle dorsiflexion is 4/5.  No redness, abrasions, or lesions on extremities  Gross sensation intact in the extremities.  Clonus negative.  Affect normal.  Alert and oriented ×3.  Coordination normal.    Imaging studies:  X-rays from October 23, 2020 for the lumbar spine were reviewed today.  An MRI from November 20, 2024 was reviewed today.    Assessment:  49-year-old gentleman here for new evaluation of low back pain and left leg radicular symptoms.  He was previously seen and sent over by Dr. Budinsky.  He has had 2 prior L5-S1 microdiscectomies, 1 in 1994, 1 in 2001.  These were both for left-sided symptoms.  Dr. Briceno performed an L3-4 midline laminectomy in 2020 again for central and left-sided symptoms.  About 5 or 6 months ago he started to develop significant left leg " radicular symptoms and low back pain.  This has been progressively getting worse.  He tried physical therapy and they stopped him after 2 visits because of the pain.  Otherwise he has not done anything conservatively then using home TENS units and taking over-the-counter medication.  No history of epidural injections.    We have reviewed tests today, x-rays, MRI.  The notes from Dr. Budinsky from November 19, 2024 were reviewed.  This is an exacerbation of a chronic problem that is severely inhibiting his bodily function.  We did consider and discuss surgery today    For complete plan and/or surgical details, please refer to Dr. Briceno's portion of this split dictation.    -Harsh Lopez PA-C    In a face-to-face encounter, I performed a history and physical examination, discussed pertinent diagnostic studies if indicated, and discussed diagnosis and management strategies with both the patient and the midlevel provider.  I reviewed the midlevel's note and agree with the documented findings and plan of care.    Patient who I have done an L3-4 midline laminectomy.  He is also had 2 microdiscectomies on the left in 1994 and 2001.  He now has left leg radiculopathy that is been going on since June but is flared up significantly recently.  Has a severe exacerbation of this problem.  He has an MRI that was ordered and done on 11/20/2024 but unfortunately gadolinium was not used.  It does show some mild impingement of the S1 nerve root on the left at L5-S1 but not that bad.  I am not sure how much of that is from scar tissue from prior surgery or new herniated disc.  The only way to know that would be with a gadolinium series.  We can get a new MRI with gadolinium to see if this is scar tissue or if this is a true disc herniation on the left at L5-S1.  He also has a disc herniation on the left at L4-5 that could be the cause of his symptoms as there is impingement of the L5 nerve root on the left at that level.   The new MRI will help clarify if this is L4-5 herniated disc on the left giving him his pain or L5-S1 stenosis on the left.  It is less likely this is coming from L5-S1 on the left.  We did discuss and consider surgery in this patient who is severely inhibited with bodily function.  Alvin Briceno MD  Orthopedic surgery

## 2024-11-27 ENCOUNTER — TELEPHONE (OUTPATIENT)
Dept: ORTHOPEDIC SURGERY | Facility: CLINIC | Age: 49
End: 2024-11-27
Payer: COMMERCIAL

## 2024-11-27 NOTE — TELEPHONE ENCOUNTER
"Patient called and stated he saw Dr. Briceno yesterday, and that there was a HEBER MRI ordered and that it was supposed to be STAT. After looking into Dr. Briceno note and speaking with his team, the order for the HEBER MRI was to be done routinely not STAT, I tried to explain this to patient, that we can NOT go against what the doctor ordered and that Dr. Briceno is out until Tuesday next week. Patient was upset by this, and put his wife on the phone I then explained to her also that I am not able to change the MRI order without Dr. Briceno's approval. She then was pleading with me or someone else in the office to change this, I advised that we can NOT go against the doctor's orders and then she proceeded to ask if there was another surgeon here that could change it, I advise her that they would not go against what Dr. Briceno has ordered as he is the only spine surgeon in the practice. I did apologize to them about this, but they kept persisting someone \"needed to do something\" as it is not fair to them that we all can \"enjoy the holiday's\" but they are unable to due to his pain. I advised if he is in that much pain he may proceed to the ED but not sure if they would order the HEBER MRI when he goes in. Patient and wife were then sent over to management to discuss further as there was nothing else I could do to help them.  "

## 2024-12-02 ENCOUNTER — APPOINTMENT (OUTPATIENT)
Dept: ORTHOPEDIC SURGERY | Facility: CLINIC | Age: 49
End: 2024-12-02
Payer: COMMERCIAL

## 2024-12-02 DIAGNOSIS — M79.604 LUMBAR PAIN WITH RADIATION DOWN RIGHT LEG: ICD-10-CM

## 2024-12-02 DIAGNOSIS — M54.50 LUMBAR PAIN WITH RADIATION DOWN RIGHT LEG: ICD-10-CM

## 2024-12-02 RX ORDER — TIZANIDINE HYDROCHLORIDE 4 MG/1
4 CAPSULE, GELATIN COATED ORAL 2 TIMES DAILY PRN
Qty: 30 CAPSULE | Refills: 0 | Status: SHIPPED | OUTPATIENT
Start: 2024-12-02

## 2024-12-02 NOTE — TELEPHONE ENCOUNTER
Rx Refill Request Telephone Encounter    Name:  Andrea Palacios  :  [id number]  Medication Name:  tiZANidine (Zanaflex) 4 mg capsule [[id number]]       Dose: 4 mg   Route: oral   Frequency: 2 times daily PRN for muscle spasms  Dispense Quantity: 30 capsule Refills: 2        Sig: Take 1 capsule (4 mg) by mouth 2 times a day as needed for muscle spasms.       Last refill: 04      Specific Pharmacy location:  [facility]/pharmacy #[id number] [address] AT [address]   Date of last appointment:  24  Date of next appointment:  no future apts  Best number to reach patient:  [phone]

## 2024-12-03 ENCOUNTER — TELEPHONE (OUTPATIENT)
Dept: ORTHOPEDIC SURGERY | Facility: CLINIC | Age: 49
End: 2024-12-03
Payer: COMMERCIAL

## 2024-12-03 ENCOUNTER — APPOINTMENT (OUTPATIENT)
Dept: PHYSICAL THERAPY | Facility: CLINIC | Age: 49
End: 2024-12-03
Payer: COMMERCIAL

## 2024-12-03 DIAGNOSIS — M51.27 HERNIATED NUCLEUS PULPOSUS OF LUMBOSACRAL REGION: ICD-10-CM

## 2024-12-03 DIAGNOSIS — M54.16 LUMBAR RADICULOPATHY: ICD-10-CM

## 2024-12-03 DIAGNOSIS — M54.16 LEFT LUMBAR RADICULITIS: ICD-10-CM

## 2024-12-03 DIAGNOSIS — M47.816 LUMBAR SPONDYLOSIS: ICD-10-CM

## 2024-12-03 NOTE — TELEPHONE ENCOUNTER
Pt calling to ask his MRI be changed to STAT. Pt states he is losing feeling in his left leg and no quality of life. His last MRI was done without contrast so a new one was ordered with contrast last week to be done as routine. Pt called last week and was told it wouldn't be changed to STAT so he is calling to ask again.

## 2024-12-10 ENCOUNTER — OFFICE VISIT (OUTPATIENT)
Dept: ORTHOPEDIC SURGERY | Facility: CLINIC | Age: 49
End: 2024-12-10
Payer: COMMERCIAL

## 2024-12-10 ENCOUNTER — APPOINTMENT (OUTPATIENT)
Dept: ORTHOPEDIC SURGERY | Facility: CLINIC | Age: 49
End: 2024-12-10
Payer: COMMERCIAL

## 2024-12-10 ENCOUNTER — APPOINTMENT (OUTPATIENT)
Dept: PHYSICAL THERAPY | Facility: CLINIC | Age: 49
End: 2024-12-10
Payer: COMMERCIAL

## 2024-12-10 DIAGNOSIS — M47.816 LUMBAR SPONDYLOSIS: ICD-10-CM

## 2024-12-10 DIAGNOSIS — M54.16 LUMBAR RADICULOPATHY: ICD-10-CM

## 2024-12-10 DIAGNOSIS — M54.16 LUMBAR RADICULOPATHY: Primary | ICD-10-CM

## 2024-12-10 DIAGNOSIS — M54.16 LEFT LUMBAR RADICULITIS: ICD-10-CM

## 2024-12-10 PROCEDURE — 99215 OFFICE O/P EST HI 40 MIN: CPT | Performed by: PHYSICAL MEDICINE & REHABILITATION

## 2024-12-10 PROCEDURE — 99215 OFFICE O/P EST HI 40 MIN: CPT | Performed by: ORTHOPAEDIC SURGERY

## 2024-12-10 PROCEDURE — 4004F PT TOBACCO SCREEN RCVD TLK: CPT | Performed by: PHYSICAL MEDICINE & REHABILITATION

## 2024-12-10 PROCEDURE — 4004F PT TOBACCO SCREEN RCVD TLK: CPT | Performed by: ORTHOPAEDIC SURGERY

## 2024-12-10 RX ORDER — GABAPENTIN 300 MG/1
600 CAPSULE ORAL 3 TIMES DAILY
Qty: 180 CAPSULE | Refills: 0 | Status: SHIPPED | OUTPATIENT
Start: 2024-12-10 | End: 2025-01-09

## 2024-12-10 SDOH — SOCIAL STABILITY: SOCIAL NETWORK: SOCIAL ACTIVITY:: 10

## 2024-12-10 NOTE — PROGRESS NOTES
"Andrea [facility] \"Jeremy" is a 49 y.o. male who presents for Follow-up of the Lower Back (MRI done at [facility]).    HPI:  Very pleasant 49-year-old gentleman here for follow-up low back MRI with and without contrast.  He denies any fever chills nausea vomiting night sweats.  She has no bowel or bladder complaints.    Physical exam:  Well-nourished, well kept.No lymphangitis or lymphadenopathy in the examined extremities.  Gait normal.  Can stand on heels and toes.   Examination of the back shows tenderness in the paraspinous musculature in the left lumbosacral area..  There is decreased range of motion in all directions due to guarding/muscle spasms and pain at extremes.  There is good strength and no instability.  Examination of the lower extremities reveals no point tenderness, swelling, or deformity.  Range of motion of the hips, knees, and ankles are full without crepitance, instability, or exacerbation of pain.  Strength is 5/5 throughout except ankle dorsiflexion on the left is about 3+/5, also except left knee flexion extension is about 4+/5.  No redness, abrasions, or lesions on extremities  Gross sensation intact in the extremities.  Affect normal.  Alert and oriented ×3.  Coordination normal.  Positive straight leg raise on the left.  Positive contralateral straight leg raise on the right.    Imaging studies:  A lumbar spine MRI with and without contrast was reviewed from December 5, 2024.    Assessment:  49-year-old gentleman here for follow-up low back MRI with and without contrast.  See previous notes for details.  Symptoms have changed somewhat since his last visit.  He is now having constant numbness and tingling in the left leg whereas before he was having intermittent numbness and tingling.  His pain may have increased as well.  Is a foot drop on the left.  He had a prior L3-4 midline laminectomy done by Dr. Briceno, he has had 2 left-sided microdiscectomies at L5-S1 on the left.  He is here today " with a contrast lumbar MRI which she did not have at his last visit.  He feels like his symptoms are progressing.    We have reviewed tests today, MRI with and without contrast.  This is an exacerbation of a chronic problem that is severely inhibiting his bodily function.  Recent labs were reviewed from January 2024.  Glucose is 100.  We did consider and discuss surgery today.    For complete plan and/or surgical details, please refer to Dr. Briceno's portion of this split dictation.    -Harsh Lopez PA-C

## 2024-12-10 NOTE — PROGRESS NOTES
Chief complaint: Left leg radiculopathy    HPI: Patient still having left leg radiculopathy.  It is very bothersome to him.  There is no fevers chills nausea vomiting.  No right-sided symptoms.  He is not sure if he can live like this anymore.    Physical exam: He does have a little ankle dorsiflexion weakness in the left versus the right.  He walks normally.  He has good plantarflexion strength.  Back scar incisions are well-healed.    MRI with gadolinium was reviewed.  I reviewed this with the radiologist in the phone as well.  He definitely has had a prior laminectomy on the left at L5-S1.  There is some stenosis on the left of the S1 nerve root but it seems to be predominantly scar and lesser degree disc bulge.  At L4-5 there is an acute disc herniation causing compression of the L5 nerve root.  There likely was a prior laminectomy on the left at L4-5 as well.  The patient has also had a midline laminectomy at L3-4.    Assessment/plan: Patient with bladder scan imaging studies.  He has left leg radiculopathy.  I had a long discussion with the patient and explained to them that their options are 1) live with the symptoms and see how they evolve, 2) physical therapy, 3) pain management or 4) surgery.    The patient thinks he wants to proceed with surgery.  I am going to send him to one of my colleagues [place] to perform the surgical intervention on him for his back.  Were also going to get him into pain management.  He can follow-up with me on a as needed basis.  He is severely inhibited with bodily function.
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
## 2024-12-16 ENCOUNTER — PATIENT OUTREACH (OUTPATIENT)
Dept: PRIMARY CARE | Facility: CLINIC | Age: 49
End: 2024-12-16
Payer: COMMERCIAL

## 2024-12-16 NOTE — PROGRESS NOTES
Discharge Facility:[facility]  Discharge Diagnosis: HOV  Admission Date:12/12/24  Discharge Date: 12/12/24    PCP Appointment Date:Patient requested to schedule  Specialist Appointment Date: N/A  Hospital Encounter and Summary Linked: Yes  See discharge assessment below for further details  Engagement  Call Start Time: 1505 (12/16/2024  3:05 PM)    Medications  Medications reviewed with patient/caregiver?: Yes (12/16/2024  3:05 PM)  Is the patient having any side effects they believe may be caused by any medication additions or changes?: No (12/16/2024  3:05 PM)  Does the patient have all medications ordered at discharge?: Yes (12/16/2024  3:05 PM)  Prescription Comments: see med list (12/16/2024  3:05 PM)  Is the patient taking all medications as directed (includes completed medication regime)?: Yes (12/16/2024  3:05 PM)    Appointments  Does the patient have a primary care provider?: Yes (12/16/2024  3:05 PM)  Care Management Interventions: Educated patient on importance of making appointment; Advised patient to make appointment (12/16/2024  3:05 PM)  Has the patient kept scheduled appointments due by today?: Yes (12/16/2024  3:05 PM)    Patient Teaching  Does the patient have access to their discharge instructions?: Yes (12/16/2024  3:05 PM)  Care Management Interventions: Reviewed instructions with patient (12/16/2024  3:05 PM)  What is the patient's perception of their health status since discharge?: Improving (12/16/2024  3:05 PM)  Is the patient/caregiver able to teach back the hierarchy of who to call/visit for symptoms/problems? PCP, Specialist, Home Health nurse, Urgent Care, ED, 911: Yes (12/16/2024  3:05 PM)    Wrap Up  Wrap Up Additional Comments: This CM spoke with pt via phone. Pt reports doing well at home since discharge. New meds reviewed. Pt denies CP and SOB. Patient denies any further discharge questions/needs at this time. Emphasized that Follow up is needed after discharge to review the  hospital recommendations, assess your response to your treatment. Pt aware of my availability for non-emergent concerns. Contact info provided to patient. (12/16/2024  3:05 PM)      Ruth Smith LPN

## 2024-12-17 ENCOUNTER — APPOINTMENT (OUTPATIENT)
Dept: PHYSICAL THERAPY | Facility: CLINIC | Age: 49
End: 2024-12-17
Payer: COMMERCIAL

## 2024-12-17 DIAGNOSIS — M54.16 LEFT LUMBAR RADICULITIS: ICD-10-CM

## 2024-12-17 DIAGNOSIS — M47.816 LUMBAR SPONDYLOSIS: ICD-10-CM

## 2024-12-18 ENCOUNTER — APPOINTMENT (OUTPATIENT)
Dept: ORTHOPEDIC SURGERY | Facility: CLINIC | Age: 49
End: 2024-12-18
Payer: COMMERCIAL

## 2024-12-23 ENCOUNTER — APPOINTMENT (OUTPATIENT)
Dept: PHYSICAL THERAPY | Facility: CLINIC | Age: 49
End: 2024-12-23
Payer: COMMERCIAL

## 2024-12-23 DIAGNOSIS — M54.16 LEFT LUMBAR RADICULITIS: ICD-10-CM

## 2024-12-23 DIAGNOSIS — M47.816 LUMBAR SPONDYLOSIS: ICD-10-CM

## 2024-12-30 ENCOUNTER — APPOINTMENT (OUTPATIENT)
Dept: PHYSICAL THERAPY | Facility: CLINIC | Age: 49
End: 2024-12-30
Payer: COMMERCIAL

## 2024-12-30 DIAGNOSIS — M47.816 LUMBAR SPONDYLOSIS: ICD-10-CM

## 2024-12-30 DIAGNOSIS — M54.16 LEFT LUMBAR RADICULITIS: ICD-10-CM

## 2025-01-06 ENCOUNTER — PATIENT OUTREACH (OUTPATIENT)
Dept: PRIMARY CARE | Facility: CLINIC | Age: 50
End: 2025-01-06
Payer: COMMERCIAL

## 2025-01-06 NOTE — PROGRESS NOTES
Call regarding appt. with PCP on  or after hospitalization.  At this time no follow up appt was made.  At time of outreach call the patient feels as if their condition has improved since last visit.  No questions or concerns at this time.

## 2025-01-07 ENCOUNTER — APPOINTMENT (OUTPATIENT)
Dept: PHYSICAL THERAPY | Facility: CLINIC | Age: 50
End: 2025-01-07
Payer: COMMERCIAL

## 2025-01-07 DIAGNOSIS — M54.16 LEFT LUMBAR RADICULITIS: ICD-10-CM

## 2025-01-07 DIAGNOSIS — M47.816 LUMBAR SPONDYLOSIS: ICD-10-CM

## 2025-01-17 ENCOUNTER — PATIENT OUTREACH (OUTPATIENT)
Dept: PRIMARY CARE | Facility: CLINIC | Age: 50
End: 2025-01-17
Payer: COMMERCIAL

## 2025-02-05 ENCOUNTER — APPOINTMENT (OUTPATIENT)
Dept: ORTHOPEDIC SURGERY | Facility: CLINIC | Age: 50
End: 2025-02-05
Payer: COMMERCIAL

## 2025-02-21 ENCOUNTER — PATIENT OUTREACH (OUTPATIENT)
Dept: PRIMARY CARE | Facility: CLINIC | Age: 50
End: 2025-02-21
Payer: COMMERCIAL

## 2025-07-24 ENCOUNTER — DOCUMENTATION (OUTPATIENT)
Dept: PHYSICAL THERAPY | Facility: CLINIC | Age: 50
End: 2025-07-24
Payer: COMMERCIAL

## 2025-07-24 DIAGNOSIS — M47.816 LUMBAR SPONDYLOSIS: ICD-10-CM

## 2025-07-24 DIAGNOSIS — M54.16 LEFT LUMBAR RADICULITIS: ICD-10-CM

## 2025-07-24 NOTE — PROGRESS NOTES
"Physical Therapy    Discharge Summary    Name: Andrea Palacios \"Chester\"  MRN: [id number]  : 1975  Date: 25    Discharge Summary: PT    Discharge Information: Date of discharge 25     Rehab Discharge Reason: Other Patient is being formally discharged from outpatient physical therapy services due to non-attendance, with no treatment or communication in over 30 consecutive days since their last scheduled visit. Progress toward established goals cannot be reassessed or documented due to the absence from care  "